# Patient Record
Sex: MALE | Race: WHITE | Employment: OTHER | URBAN - METROPOLITAN AREA
[De-identification: names, ages, dates, MRNs, and addresses within clinical notes are randomized per-mention and may not be internally consistent; named-entity substitution may affect disease eponyms.]

---

## 2017-03-10 ENCOUNTER — HOSPITAL ENCOUNTER (OUTPATIENT)
Dept: GENERAL RADIOLOGY | Facility: HOSPITAL | Age: 72
Discharge: HOME | End: 2017-03-10
Attending: INTERNAL MEDICINE
Payer: MEDICARE

## 2017-03-10 ENCOUNTER — EKG ENCOUNTER (OUTPATIENT)
Dept: LAB | Facility: HOSPITAL | Age: 72
End: 2017-03-10
Attending: INTERNAL MEDICINE
Payer: MEDICARE

## 2017-03-10 ENCOUNTER — HOSPITAL ENCOUNTER (OUTPATIENT)
Dept: CV DIAGNOSTICS | Facility: HOSPITAL | Age: 72
Discharge: HOME | End: 2017-03-10
Attending: INTERNAL MEDICINE
Payer: MEDICARE

## 2017-03-10 DIAGNOSIS — R07.9 CHEST PAIN: ICD-10-CM

## 2017-03-10 DIAGNOSIS — I25.10 CAD (CORONARY ARTERY DISEASE): ICD-10-CM

## 2017-03-10 LAB
ATRIAL RATE: 91 BPM
P AXIS: 79 DEGREES
P-R INTERVAL: 150 MS
Q-T INTERVAL: 364 MS
QRS DURATION: 64 MS
QTC CALCULATION (BEZET): 447 MS
R AXIS: 75 DEGREES
T AXIS: 68 DEGREES
VENTRICULAR RATE: 91 BPM

## 2017-03-10 PROCEDURE — 93017 CV STRESS TEST TRACING ONLY: CPT

## 2017-03-10 PROCEDURE — 93018 CV STRESS TEST I&R ONLY: CPT | Performed by: INTERNAL MEDICINE

## 2017-03-10 PROCEDURE — 93010 ELECTROCARDIOGRAM REPORT: CPT | Performed by: INTERNAL MEDICINE

## 2017-03-10 PROCEDURE — 93005 ELECTROCARDIOGRAM TRACING: CPT

## 2017-03-10 PROCEDURE — 71020 XR CHEST PA + LAT CHEST (CPT=71020): CPT

## 2017-03-30 ENCOUNTER — HOSPITAL ENCOUNTER (OUTPATIENT)
Dept: CV DIAGNOSTICS | Facility: HOSPITAL | Age: 72
Discharge: HOME | End: 2017-03-30
Attending: INTERNAL MEDICINE
Payer: MEDICARE

## 2017-03-30 DIAGNOSIS — R53.83 FATIGUE: ICD-10-CM

## 2017-03-30 DIAGNOSIS — R06.00 DOE (DYSPNEA ON EXERTION): ICD-10-CM

## 2017-03-30 DIAGNOSIS — I25.10 CAD (CORONARY ARTERY DISEASE): ICD-10-CM

## 2017-03-30 PROCEDURE — 93017 CV STRESS TEST TRACING ONLY: CPT

## 2017-03-30 PROCEDURE — 93350 STRESS TTE ONLY: CPT

## 2017-03-30 PROCEDURE — 93018 CV STRESS TEST I&R ONLY: CPT | Performed by: INTERNAL MEDICINE

## 2017-03-30 PROCEDURE — 93350 STRESS TTE ONLY: CPT | Performed by: INTERNAL MEDICINE

## 2017-03-31 NOTE — PROGRESS NOTES
Quick Note:    Please contact the patient and tell him that the echo portion of the stress test was normal, the EKG portion continues to be abnormal and I do not feel that he has any major coronary artery problems.        Yaniv Quiros M.D.

## 2017-04-06 PROBLEM — I51.5 CARDIAC CALCIFICATION (HCC): Status: ACTIVE | Noted: 2017-04-06

## 2017-08-08 ENCOUNTER — LAB ENCOUNTER (OUTPATIENT)
Dept: LAB | Facility: HOSPITAL | Age: 72
End: 2017-08-08
Attending: INTERNAL MEDICINE
Payer: MEDICARE

## 2017-08-08 DIAGNOSIS — K52.9 CHRONIC DIARRHEA: Primary | ICD-10-CM

## 2017-08-08 LAB
CRYPTOSP AG STL QL IA: NEGATIVE
G LAMBLIA AG STL QL IA: NEGATIVE

## 2017-08-08 PROCEDURE — 87046 STOOL CULTR AEROBIC BACT EA: CPT

## 2017-08-08 PROCEDURE — 87272 CRYPTOSPORIDIUM AG IF: CPT

## 2017-08-08 PROCEDURE — 87427 SHIGA-LIKE TOXIN AG IA: CPT

## 2017-08-08 PROCEDURE — 87329 GIARDIA AG IA: CPT

## 2017-08-08 PROCEDURE — 82272 OCCULT BLD FECES 1-3 TESTS: CPT

## 2017-08-08 PROCEDURE — 87209 SMEAR COMPLEX STAIN: CPT

## 2017-08-08 PROCEDURE — 87045 FECES CULTURE AEROBIC BACT: CPT

## 2017-08-08 PROCEDURE — 87177 OVA AND PARASITES SMEARS: CPT

## 2017-08-10 LAB
OVA AND PARASITE, TRICHROME STAIN: NEGATIVE
OVA AND PARASITE, WET MOUNT: NEGATIVE

## 2017-08-24 ENCOUNTER — LAB ENCOUNTER (OUTPATIENT)
Dept: LAB | Facility: HOSPITAL | Age: 72
End: 2017-08-24
Attending: PHYSICIAN ASSISTANT
Payer: MEDICARE

## 2017-08-24 DIAGNOSIS — K52.9 CHRONIC DIARRHEA: ICD-10-CM

## 2017-08-24 DIAGNOSIS — R19.7 DIARRHEA, UNSPECIFIED TYPE: ICD-10-CM

## 2017-08-24 LAB
ALBUMIN SERPL-MCNC: 3.7 G/DL (ref 3.5–4.8)
ALP LIVER SERPL-CCNC: 92 U/L (ref 45–117)
ALT SERPL-CCNC: 38 U/L (ref 17–63)
AST SERPL-CCNC: 39 U/L (ref 15–41)
BASOPHILS # BLD AUTO: 0.06 X10(3) UL (ref 0–0.1)
BASOPHILS NFR BLD AUTO: 0.8 %
BILIRUB SERPL-MCNC: 0.8 MG/DL (ref 0.1–2)
BUN BLD-MCNC: 13 MG/DL (ref 8–20)
C-REACTIVE PROTEIN: <0.29 MG/DL (ref ?–1)
CALCIUM BLD-MCNC: 9.4 MG/DL (ref 8.3–10.3)
CHLORIDE: 107 MMOL/L (ref 101–111)
CO2: 29 MMOL/L (ref 22–32)
CREAT BLD-MCNC: 1.08 MG/DL (ref 0.7–1.3)
EOSINOPHIL # BLD AUTO: 0.32 X10(3) UL (ref 0–0.3)
EOSINOPHIL NFR BLD AUTO: 4.4 %
ERYTHROCYTE [DISTWIDTH] IN BLOOD BY AUTOMATED COUNT: 12.5 % (ref 11.5–16)
FREE T4: 1 NG/DL (ref 0.9–1.8)
GLUCOSE BLD-MCNC: 106 MG/DL (ref 70–99)
HCT VFR BLD AUTO: 41.8 % (ref 37–53)
HGB BLD-MCNC: 14 G/DL (ref 13–17)
IMMATURE GRANULOCYTE COUNT: 0.02 X10(3) UL (ref 0–1)
IMMATURE GRANULOCYTE RATIO %: 0.3 %
IMMUNOGLOBULIN A: 269 MG/DL (ref 70–312)
LYMPHOCYTES # BLD AUTO: 0.91 X10(3) UL (ref 0.9–4)
LYMPHOCYTES NFR BLD AUTO: 12.5 %
M PROTEIN MFR SERPL ELPH: 7.2 G/DL (ref 6.1–8.3)
MCH RBC QN AUTO: 30.2 PG (ref 27–33.2)
MCHC RBC AUTO-ENTMCNC: 33.5 G/DL (ref 31–37)
MCV RBC AUTO: 90.1 FL (ref 80–99)
MONOCYTES # BLD AUTO: 0.64 X10(3) UL (ref 0.1–0.6)
MONOCYTES NFR BLD AUTO: 8.8 %
NEUTROPHIL ABS PRELIM: 5.31 X10 (3) UL (ref 1.3–6.7)
NEUTROPHILS # BLD AUTO: 5.31 X10(3) UL (ref 1.3–6.7)
NEUTROPHILS NFR BLD AUTO: 73.2 %
PLATELET # BLD AUTO: 228 10(3)UL (ref 150–450)
POTASSIUM SERPL-SCNC: 4.9 MMOL/L (ref 3.6–5.1)
RBC # BLD AUTO: 4.64 X10(6)UL (ref 3.8–5.8)
RED CELL DISTRIBUTION WIDTH-SD: 41.1 FL (ref 35.1–46.3)
SED RATE-ML: 8 MM/HR (ref 0–12)
SODIUM SERPL-SCNC: 143 MMOL/L (ref 136–144)
TSI SER-ACNC: 2.64 MIU/ML (ref 0.35–5.5)
WBC # BLD AUTO: 7.3 X10(3) UL (ref 4–13)

## 2017-08-24 PROCEDURE — 80053 COMPREHEN METABOLIC PANEL: CPT

## 2017-08-24 PROCEDURE — 86140 C-REACTIVE PROTEIN: CPT

## 2017-08-24 PROCEDURE — 87493 C DIFF AMPLIFIED PROBE: CPT

## 2017-08-24 PROCEDURE — 85652 RBC SED RATE AUTOMATED: CPT

## 2017-08-24 PROCEDURE — 84443 ASSAY THYROID STIM HORMONE: CPT

## 2017-08-24 PROCEDURE — 36415 COLL VENOUS BLD VENIPUNCTURE: CPT

## 2017-08-24 PROCEDURE — 84439 ASSAY OF FREE THYROXINE: CPT

## 2017-08-24 PROCEDURE — 83516 IMMUNOASSAY NONANTIBODY: CPT

## 2017-08-24 PROCEDURE — 82784 ASSAY IGA/IGD/IGG/IGM EACH: CPT

## 2017-08-24 PROCEDURE — 85025 COMPLETE CBC W/AUTO DIFF WBC: CPT

## 2017-08-28 LAB
TISSUE TRANSGLUTAMINASE AB,IGA: <1.2 U/ML (ref ?–15)
TISSUE TRANSGLUTAMINASE IGA QUALITATIVE: NEGATIVE

## 2017-09-12 ENCOUNTER — HOSPITAL ENCOUNTER (OUTPATIENT)
Dept: CT IMAGING | Facility: HOSPITAL | Age: 72
Discharge: HOME | End: 2017-09-12
Attending: PHYSICIAN ASSISTANT
Payer: MEDICARE

## 2017-09-12 DIAGNOSIS — R19.7 DIARRHEA, UNSPECIFIED TYPE: ICD-10-CM

## 2017-09-12 PROCEDURE — 74177 CT ABD & PELVIS W/CONTRAST: CPT | Performed by: PHYSICIAN ASSISTANT

## 2018-01-08 ENCOUNTER — HOSPITAL ENCOUNTER (OUTPATIENT)
Dept: GENERAL RADIOLOGY | Facility: HOSPITAL | Age: 73
Discharge: HOME | End: 2018-01-08
Attending: INTERNAL MEDICINE
Payer: MEDICARE

## 2018-01-08 DIAGNOSIS — M25.552 LEFT HIP PAIN: ICD-10-CM

## 2018-01-08 PROCEDURE — 72110 X-RAY EXAM L-2 SPINE 4/>VWS: CPT | Performed by: INTERNAL MEDICINE

## 2018-01-08 PROCEDURE — 73502 X-RAY EXAM HIP UNI 2-3 VIEWS: CPT | Performed by: INTERNAL MEDICINE

## 2018-06-06 ENCOUNTER — APPOINTMENT (OUTPATIENT)
Dept: CT IMAGING | Facility: HOSPITAL | Age: 73
End: 2018-06-06
Attending: EMERGENCY MEDICINE
Payer: MEDICARE

## 2018-06-06 ENCOUNTER — HOSPITAL ENCOUNTER (EMERGENCY)
Facility: HOSPITAL | Age: 73
Discharge: HOME | End: 2018-06-06
Attending: EMERGENCY MEDICINE
Payer: MEDICARE

## 2018-06-06 VITALS
RESPIRATION RATE: 16 BRPM | DIASTOLIC BLOOD PRESSURE: 64 MMHG | HEART RATE: 64 BPM | OXYGEN SATURATION: 99 % | HEIGHT: 68 IN | BODY MASS INDEX: 24.25 KG/M2 | WEIGHT: 160 LBS | TEMPERATURE: 97 F | SYSTOLIC BLOOD PRESSURE: 116 MMHG

## 2018-06-06 DIAGNOSIS — S09.8XXA BLUNT HEAD TRAUMA, INITIAL ENCOUNTER: Primary | ICD-10-CM

## 2018-06-06 PROCEDURE — 99284 EMERGENCY DEPT VISIT MOD MDM: CPT

## 2018-06-06 PROCEDURE — 70450 CT HEAD/BRAIN W/O DYE: CPT | Performed by: EMERGENCY MEDICINE

## 2018-06-06 NOTE — ED NOTES
Patient waiting for CT at this time. Wife at bedside. Observed ambulatory to bathroom with steady gait. No distress observed. Declines offer of ice pack for site of laceration repair.

## 2018-06-06 NOTE — ED NOTES
Spoke with Dr. Klaus Gayle at this time regarding patient.  Provided with callback number once she consults with MD.

## 2018-06-06 NOTE — ED PROVIDER NOTES
Patient Seen in: [facility] Emergency Department    History   Patient presents with:  Head Neck Injury (neurologic, musculoskeletal)    Stated Complaint: head injury from clinic, needs CT.  on plavix    HPI    75-year-old male sent for evaluation of Pt is oriented to person, place, and time. Appears well-developed and well-nourished. Head: Normocephalic, there is a  Nose: Nose normal.   Eyes: EOM are normal. Pupils are equal, round, and reactive to light. Neck: Normal range of motion. Neck supple. encounter  (primary encounter diagnosis)    Disposition:  There is no disposition on file for this visit. There is no disposition time on file for this visit.     Follow-up:  Britany Thomson MD  [address] [address]

## 2018-06-06 NOTE — ED NOTES
Patient observed ambulatory to room at this time. Care of patient assumed. Patient has already received staples for scalp lac and tetanus shot at immediate care. Sent for imaging d/t being on plavix and aspirin.  Patient alert and oriented, denies any dizzi

## 2018-06-06 NOTE — ED NOTES
Spoke with Dr. Zeyad White additional time, informed that Dr. Josue Angel reviewed CT at this time and patient can be discharged home with outpatient followup. Patient to be educated to return to ED for any development of symptoms.  Natalya Sanchez at bedside

## 2018-06-06 NOTE — ED NOTES
Patient back from CT and resting comfortably on cart. No distress observed. No complaints at this time. Wife at bedside.

## 2018-06-06 NOTE — ED INITIAL ASSESSMENT (HPI)
Patient presents from immediate care. He was outside today and lost his balance, fell backward, and injured the top of his head on a metal bench. Immediate care stapled laceration and provided tetanus immunization.  He is on plavix and was sent for further

## 2019-06-14 ENCOUNTER — APPOINTMENT (OUTPATIENT)
Dept: GENERAL RADIOLOGY | Facility: HOSPITAL | Age: 74
End: 2019-06-14
Attending: EMERGENCY MEDICINE
Payer: MEDICARE

## 2019-06-14 ENCOUNTER — APPOINTMENT (OUTPATIENT)
Dept: CT IMAGING | Facility: HOSPITAL | Age: 74
End: 2019-06-14
Attending: EMERGENCY MEDICINE
Payer: MEDICARE

## 2019-06-14 ENCOUNTER — HOSPITAL ENCOUNTER (EMERGENCY)
Facility: HOSPITAL | Age: 74
Discharge: HOME | End: 2019-06-14
Attending: EMERGENCY MEDICINE
Payer: MEDICARE

## 2019-06-14 VITALS
RESPIRATION RATE: 13 BRPM | OXYGEN SATURATION: 99 % | DIASTOLIC BLOOD PRESSURE: 72 MMHG | BODY MASS INDEX: 23.62 KG/M2 | HEIGHT: 70 IN | WEIGHT: 165 LBS | SYSTOLIC BLOOD PRESSURE: 135 MMHG | TEMPERATURE: 99 F | HEART RATE: 72 BPM

## 2019-06-14 DIAGNOSIS — R91.8 PULMONARY NODULES: ICD-10-CM

## 2019-06-14 DIAGNOSIS — J40 BRONCHITIS: Primary | ICD-10-CM

## 2019-06-14 PROCEDURE — 93005 ELECTROCARDIOGRAM TRACING: CPT

## 2019-06-14 PROCEDURE — 85378 FIBRIN DEGRADE SEMIQUANT: CPT | Performed by: EMERGENCY MEDICINE

## 2019-06-14 PROCEDURE — 71275 CT ANGIOGRAPHY CHEST: CPT | Performed by: EMERGENCY MEDICINE

## 2019-06-14 PROCEDURE — 80053 COMPREHEN METABOLIC PANEL: CPT | Performed by: EMERGENCY MEDICINE

## 2019-06-14 PROCEDURE — 85730 THROMBOPLASTIN TIME PARTIAL: CPT | Performed by: EMERGENCY MEDICINE

## 2019-06-14 PROCEDURE — 99285 EMERGENCY DEPT VISIT HI MDM: CPT

## 2019-06-14 PROCEDURE — 93010 ELECTROCARDIOGRAM REPORT: CPT

## 2019-06-14 PROCEDURE — 36415 COLL VENOUS BLD VENIPUNCTURE: CPT

## 2019-06-14 PROCEDURE — 85610 PROTHROMBIN TIME: CPT | Performed by: EMERGENCY MEDICINE

## 2019-06-14 PROCEDURE — 71046 X-RAY EXAM CHEST 2 VIEWS: CPT | Performed by: EMERGENCY MEDICINE

## 2019-06-14 PROCEDURE — 85025 COMPLETE CBC W/AUTO DIFF WBC: CPT | Performed by: EMERGENCY MEDICINE

## 2019-06-14 PROCEDURE — 94640 AIRWAY INHALATION TREATMENT: CPT

## 2019-06-14 PROCEDURE — 83880 ASSAY OF NATRIURETIC PEPTIDE: CPT | Performed by: EMERGENCY MEDICINE

## 2019-06-14 PROCEDURE — 84484 ASSAY OF TROPONIN QUANT: CPT | Performed by: EMERGENCY MEDICINE

## 2019-06-14 RX ORDER — IPRATROPIUM BROMIDE AND ALBUTEROL SULFATE 2.5; .5 MG/3ML; MG/3ML
3 SOLUTION RESPIRATORY (INHALATION) ONCE
Status: COMPLETED | OUTPATIENT
Start: 2019-06-14 | End: 2019-06-14

## 2019-06-14 RX ORDER — PREDNISONE 20 MG/1
40 TABLET ORAL DAILY
Qty: 10 TABLET | Refills: 0 | Status: SHIPPED | OUTPATIENT
Start: 2019-06-14 | End: 2019-06-19

## 2019-06-14 RX ORDER — ALBUTEROL SULFATE 90 UG/1
2 AEROSOL, METERED RESPIRATORY (INHALATION) EVERY 4 HOURS PRN
Qty: 1 INHALER | Refills: 0 | Status: SHIPPED | OUTPATIENT
Start: 2019-06-14 | End: 2019-07-14

## 2019-06-14 RX ORDER — PREDNISONE 20 MG/1
60 TABLET ORAL ONCE
Status: COMPLETED | OUTPATIENT
Start: 2019-06-14 | End: 2019-06-14

## 2019-06-14 NOTE — ED INITIAL ASSESSMENT (HPI)
Patient presents with cold symptoms that had began 2 weeks PTA that has progressively worsened. Patient denies fever, though reports consistent congested cough and increased fatigue. Seen by PCP and sent to ER for breathing treatment.

## 2019-06-14 NOTE — ED PROVIDER NOTES
Patient Seen in: [facility] Emergency Department    History   Patient presents with:  Dyspnea RADHA SOB (respiratory)    Stated Complaint: radha    HPI    This is a 12-year-old male who presents with shortness of breath and cough with past medical histo 1706 129/84   Pulse 06/14/19 1706 80   Resp 06/14/19 1706 14   Temp 06/14/19 1706 98.9 °F (37.2 °C)   Temp src 06/14/19 1706 Temporal   SpO2 06/14/19 1706 93 %   O2 Device 06/14/19 1703 None (Room air)       Current:/72   Pulse 72   Temp 98.9 °F (37. Abnormality         Status                     ---------                               -----------         ------                     CBC W/ DIFFERENTIAL[[id number]]          Abnormal            Final result                 Please view results transcribed by Technologist)  Patient has had difficulty breathing with persistent congested cough abd increased fatigue. CONTRAST USED:  70cc of Omnipaque 350  FINDINGS:  VASCULATURE:  Normal 3 vessel arch. Atheromatous calcifications of the aorta.   No chest was obtained and demonstrated no acute PE. Multiple pulmonary nodules were noted. Bronchial wall thickening consistent with bronchitis was noted. These findings were communicated to patient. Will treat for bronchitis. Prednisone given in ER.

## 2019-07-19 ENCOUNTER — RT VISIT (OUTPATIENT)
Dept: RESPIRATORY THERAPY | Facility: HOSPITAL | Age: 74
End: 2019-07-19
Attending: INTERNAL MEDICINE
Payer: MEDICARE

## 2019-07-19 ENCOUNTER — HOSPITAL ENCOUNTER (OUTPATIENT)
Dept: GENERAL RADIOLOGY | Facility: HOSPITAL | Age: 74
Discharge: HOME | End: 2019-07-19
Attending: INTERNAL MEDICINE
Payer: MEDICARE

## 2019-07-19 DIAGNOSIS — R05.9 COUGH: ICD-10-CM

## 2019-07-19 DIAGNOSIS — R06.02 SOB (SHORTNESS OF BREATH): ICD-10-CM

## 2019-07-19 PROCEDURE — 71046 X-RAY EXAM CHEST 2 VIEWS: CPT | Performed by: INTERNAL MEDICINE

## 2019-07-23 NOTE — PROCEDURES
Spirometry and  flow volume loop show evidence of mild obstruction. .    There was no change in spirometry after bronchodilator challenge. Normal TLC, no evidence of restriction    There is mild toward moderate decrease in the diffusing capacity. Pauline Alexis

## 2020-07-07 ENCOUNTER — HOSPITAL ENCOUNTER (OUTPATIENT)
Dept: GENERAL RADIOLOGY | Age: 75
Discharge: HOME | End: 2020-07-07
Attending: INTERNAL MEDICINE
Payer: MEDICARE

## 2020-07-07 DIAGNOSIS — R07.81 RIB PAIN ON RIGHT SIDE: ICD-10-CM
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
## 2020-07-07 DIAGNOSIS — M54.9 MID BACK PAIN ON RIGHT SIDE: ICD-10-CM

## 2020-07-07 PROCEDURE — 72072 X-RAY EXAM THORAC SPINE 3VWS: CPT | Performed by: INTERNAL MEDICINE

## 2020-07-07 PROCEDURE — 71101 X-RAY EXAM UNILAT RIBS/CHEST: CPT | Performed by: INTERNAL MEDICINE

## 2020-10-19 ENCOUNTER — OFFICE VISIT (OUTPATIENT)
Dept: PODIATRY CLINIC | Facility: CLINIC | Age: 75
End: 2020-10-19
Payer: COMMERCIAL

## 2020-10-19 DIAGNOSIS — M20.42 HAMMER TOE OF LEFT FOOT: ICD-10-CM

## 2020-10-19 DIAGNOSIS — M79.672 LEFT FOOT PAIN: Primary | ICD-10-CM

## 2020-10-19 PROCEDURE — 99203 OFFICE O/P NEW LOW 30 MIN: CPT | Performed by: PODIATRIST

## 2020-10-19 NOTE — PROGRESS NOTES
Jaclyn Acuna is a 66year old male. Patient presents with:  New Patient: 2nd toe left foot hurts - has been bent for years - pain scale 6/10.         HPI:     Resents today he has had some pain and discomfort second toe the toe has been tender for years i Years of education: Not on file      Highest education level: Not on file    Occupational History      Occupation: Retired     Tobacco Use      Smoking status: Never Smoker      Smokeless tobacco: Never Used    Substance and Sexual Activ of these issues and agrees to the plan. No follow-ups on file.     Jaelyn Medeiros DPM  10/19/2020

## 2021-02-02 ENCOUNTER — IMMUNIZATION (OUTPATIENT)
Dept: LAB | Facility: HOSPITAL | Age: 76
End: 2021-02-02
Attending: EMERGENCY MEDICINE
Payer: MEDICARE

## 2021-02-02 DIAGNOSIS — Z23 NEED FOR VACCINATION: Primary | ICD-10-CM

## 2021-02-02 PROCEDURE — 0011A SARSCOV2 VAC 100MCG/0.5ML IM: CPT

## 2021-03-02 ENCOUNTER — IMMUNIZATION (OUTPATIENT)
Dept: LAB | Facility: HOSPITAL | Age: 76
End: 2021-03-02
Attending: EMERGENCY MEDICINE
Payer: MEDICARE

## 2021-03-02 DIAGNOSIS — Z23 NEED FOR VACCINATION: Primary | ICD-10-CM

## 2021-03-02 PROCEDURE — 0012A SARSCOV2 VAC 100MCG/0.5ML IM: CPT

## 2021-08-11 ENCOUNTER — LABORATORY ENCOUNTER (OUTPATIENT)
Dept: LAB | Age: 76
End: 2021-08-11
Attending: INTERNAL MEDICINE
Payer: MEDICARE

## 2021-08-11 DIAGNOSIS — D64.9 ANEMIA: Primary | ICD-10-CM

## 2021-08-11 DIAGNOSIS — M62.838 MUSCLE SPASM: ICD-10-CM

## 2021-08-11 LAB
HAV IGM SER QL: 1.9 MG/DL (ref 1.6–2.6)
IRON SATURATION: 16 %
IRON SERPL-MCNC: 45 UG/DL
POTASSIUM SERPL-SCNC: 3.9 MMOL/L (ref 3.5–5.1)
TOTAL IRON BINDING CAPACITY: 280 UG/DL (ref 240–450)
TRANSFERRIN SERPL-MCNC: 188 MG/DL (ref 200–360)

## 2021-08-11 PROCEDURE — 83735 ASSAY OF MAGNESIUM: CPT

## 2021-08-11 PROCEDURE — 84132 ASSAY OF SERUM POTASSIUM: CPT

## 2021-08-11 PROCEDURE — 83540 ASSAY OF IRON: CPT

## 2021-08-11 PROCEDURE — 83550 IRON BINDING TEST: CPT

## 2021-08-11 PROCEDURE — 36415 COLL VENOUS BLD VENIPUNCTURE: CPT

## 2021-08-12 ENCOUNTER — LAB ENCOUNTER (OUTPATIENT)
Dept: LAB | Age: 76
End: 2021-08-12
Attending: INTERNAL MEDICINE
Payer: MEDICARE

## 2021-08-12 DIAGNOSIS — D64.9 ANEMIA: Primary | ICD-10-CM

## 2021-08-12 LAB
BASOPHILS # BLD AUTO: 0.07 X10(3) UL (ref 0–0.2)
BASOPHILS NFR BLD AUTO: 0.7 %
EOSINOPHIL # BLD AUTO: 0.33 X10(3) UL (ref 0–0.7)
EOSINOPHIL NFR BLD AUTO: 3.1 %
ERYTHROCYTE [DISTWIDTH] IN BLOOD BY AUTOMATED COUNT: 13.9 %
HCT VFR BLD AUTO: 27.1 %
HGB BLD-MCNC: 9 G/DL
IMM GRANULOCYTES # BLD AUTO: 0.23 X10(3) UL (ref 0–1)
IMM GRANULOCYTES NFR BLD: 2.1 %
LYMPHOCYTES # BLD AUTO: 1.91 X10(3) UL (ref 1–4)
LYMPHOCYTES NFR BLD AUTO: 17.8 %
MCH RBC QN AUTO: 30.8 PG (ref 26–34)
MCHC RBC AUTO-ENTMCNC: 33.2 G/DL (ref 31–37)
MCV RBC AUTO: 92.8 FL
MONOCYTES # BLD AUTO: 0.79 X10(3) UL (ref 0.1–1)
MONOCYTES NFR BLD AUTO: 7.4 %
NEUTROPHILS # BLD AUTO: 7.39 X10 (3) UL (ref 1.5–7.7)
NEUTROPHILS # BLD AUTO: 7.39 X10(3) UL (ref 1.5–7.7)
NEUTROPHILS NFR BLD AUTO: 68.9 %
PLATELET # BLD AUTO: 489 10(3)UL (ref 150–450)
RBC # BLD AUTO: 2.92 X10(6)UL
WBC # BLD AUTO: 10.7 X10(3) UL (ref 4–11)

## 2021-08-12 PROCEDURE — 85025 COMPLETE CBC W/AUTO DIFF WBC: CPT

## 2021-08-12 PROCEDURE — 36415 COLL VENOUS BLD VENIPUNCTURE: CPT

## 2021-08-17 ENCOUNTER — TELEPHONE (OUTPATIENT)
Dept: PHYSICAL THERAPY | Facility: HOSPITAL | Age: 76
End: 2021-08-17

## 2021-08-17 ENCOUNTER — ORDER TRANSCRIPTION (OUTPATIENT)
Dept: PHYSICAL THERAPY | Facility: HOSPITAL | Age: 76
End: 2021-08-17

## 2021-08-17 DIAGNOSIS — Z96.641 STATUS POST RIGHT HIP REPLACEMENT: ICD-10-CM

## 2021-08-17 DIAGNOSIS — Z87.81 S/P RIGHT HIP FRACTURE: Primary | ICD-10-CM

## 2021-08-18 ENCOUNTER — OFFICE VISIT (OUTPATIENT)
Dept: PHYSICAL THERAPY | Facility: HOSPITAL | Age: 76
End: 2021-08-18
Attending: ORTHOPAEDIC SURGERY
Payer: MEDICARE

## 2021-08-18 DIAGNOSIS — Z96.641 STATUS POST RIGHT HIP REPLACEMENT: ICD-10-CM

## 2021-08-18 DIAGNOSIS — Z87.81 S/P RIGHT HIP FRACTURE: ICD-10-CM

## 2021-08-18 PROCEDURE — 97161 PT EVAL LOW COMPLEX 20 MIN: CPT

## 2021-08-18 PROCEDURE — 97110 THERAPEUTIC EXERCISES: CPT

## 2021-08-18 NOTE — PROGRESS NOTES
POST-OP HIP EVALUATION:   Referring Physician: Dr. 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 Pt voiced understanding and performs HEP correctly without reported pain. Skilled Physical Therapy is medically necessary to address the above impairments and reach functional goals.      Precautions:  no crossing leg, deep squattin strength    Charges: PT Eval Low Complexity, therex x 1      Total Timed Treatment: 15 min     Total Treatment Time: 45 min     PLAN OF CARE:    Goals: (To be met in 10 visits)   · Pt will have improved hip AROM Flex to 120 deg and ABD to 35 deg to be able

## 2021-08-24 ENCOUNTER — OFFICE VISIT (OUTPATIENT)
Dept: PHYSICAL THERAPY | Facility: HOSPITAL | Age: 76
End: 2021-08-24
Attending: ORTHOPAEDIC SURGERY
Payer: MEDICARE

## 2021-08-24 PROCEDURE — 97110 THERAPEUTIC EXERCISES: CPT

## 2021-08-24 PROCEDURE — 97140 MANUAL THERAPY 1/> REGIONS: CPT

## 2021-08-24 NOTE — PROGRESS NOTES
Dx: S/P right hip fracture (Z87.81)  Status post right hip replacement (Z96.641)             Authorized # of Visits:  10 (Medicare)         Next MD visit: none scheduled  Fall Risk: standard         Precautions: n/a             Subjective: Patient reports Single leg balance, 3 min         Lateral walk, yellow TB, 2 laps         Bridge, 2x15         S/l clam, red TB, 2x10         Sit<>stand to table slow, 8         Shuttle SLP, 37#, 10         Passive hip ROM, 10 min             Charges: Reny 2( 35 min) man

## 2021-08-26 ENCOUNTER — LAB ENCOUNTER (OUTPATIENT)
Dept: LAB | Age: 76
End: 2021-08-26
Attending: INTERNAL MEDICINE
Payer: MEDICARE

## 2021-08-26 ENCOUNTER — OFFICE VISIT (OUTPATIENT)
Dept: PHYSICAL THERAPY | Facility: HOSPITAL | Age: 76
End: 2021-08-26
Attending: ORTHOPAEDIC SURGERY
Payer: MEDICARE

## 2021-08-26 DIAGNOSIS — D64.9 ANEMIA: Primary | ICD-10-CM

## 2021-08-26 LAB
BASOPHILS # BLD AUTO: 0.06 X10(3) UL (ref 0–0.2)
BASOPHILS NFR BLD AUTO: 0.9 %
EOSINOPHIL # BLD AUTO: 0.29 X10(3) UL (ref 0–0.7)
EOSINOPHIL NFR BLD AUTO: 4.3 %
ERYTHROCYTE [DISTWIDTH] IN BLOOD BY AUTOMATED COUNT: 13.8 %
HCT VFR BLD AUTO: 31.2 %
HGB BLD-MCNC: 10 G/DL
IMM GRANULOCYTES # BLD AUTO: 0.02 X10(3) UL (ref 0–1)
IMM GRANULOCYTES NFR BLD: 0.3 %
LYMPHOCYTES # BLD AUTO: 1.17 X10(3) UL (ref 1–4)
LYMPHOCYTES NFR BLD AUTO: 17.5 %
MCH RBC QN AUTO: 30 PG (ref 26–34)
MCHC RBC AUTO-ENTMCNC: 32.1 G/DL (ref 31–37)
MCV RBC AUTO: 93.7 FL
MONOCYTES # BLD AUTO: 0.65 X10(3) UL (ref 0.1–1)
MONOCYTES NFR BLD AUTO: 9.7 %
NEUTROPHILS # BLD AUTO: 4.48 X10 (3) UL (ref 1.5–7.7)
NEUTROPHILS # BLD AUTO: 4.48 X10(3) UL (ref 1.5–7.7)
NEUTROPHILS NFR BLD AUTO: 67.3 %
PLATELET # BLD AUTO: 376 10(3)UL (ref 150–450)
RBC # BLD AUTO: 3.33 X10(6)UL
WBC # BLD AUTO: 6.7 X10(3) UL (ref 4–11)

## 2021-08-26 PROCEDURE — 97140 MANUAL THERAPY 1/> REGIONS: CPT

## 2021-08-26 PROCEDURE — 36415 COLL VENOUS BLD VENIPUNCTURE: CPT

## 2021-08-26 PROCEDURE — 97110 THERAPEUTIC EXERCISES: CPT

## 2021-08-26 PROCEDURE — 85025 COMPLETE CBC W/AUTO DIFF WBC: CPT

## 2021-08-26 NOTE — PROGRESS NOTES
Dx: S/P right hip fracture (Z87.81)  Status post right hip replacement (Z96.641)             Authorized # of Visits:  10 (Medicare)         Next MD visit: none scheduled  Fall Risk: standard         Precautions: n/a             Subjective: Patient reports walking, 3 laps        Single leg balance, 3 min Troy step over and lateral 3 laps total        Lateral walk, yellow TB, 2 laps Lateral walk, yellow TB, 3 laps        Bridge, 2x15         S/l clam, red TB, 2x10         Sit<>stand to table slow, 8 Sit<>st

## 2021-08-31 ENCOUNTER — TELEPHONE (OUTPATIENT)
Dept: PHYSICAL THERAPY | Facility: HOSPITAL | Age: 76
End: 2021-08-31

## 2021-09-01 ENCOUNTER — APPOINTMENT (OUTPATIENT)
Dept: PHYSICAL THERAPY | Facility: HOSPITAL | Age: 76
End: 2021-09-01
Attending: ORTHOPAEDIC SURGERY
Payer: MEDICARE

## 2021-09-08 ENCOUNTER — APPOINTMENT (OUTPATIENT)
Dept: PHYSICAL THERAPY | Facility: HOSPITAL | Age: 76
End: 2021-09-08
Attending: ORTHOPAEDIC SURGERY
Payer: MEDICARE

## 2021-09-13 ENCOUNTER — OFFICE VISIT (OUTPATIENT)
Dept: PHYSICAL THERAPY | Facility: HOSPITAL | Age: 76
End: 2021-09-13
Attending: ORTHOPAEDIC SURGERY
Payer: MEDICARE

## 2021-09-13 PROCEDURE — 97110 THERAPEUTIC EXERCISES: CPT

## 2021-09-13 PROCEDURE — 97140 MANUAL THERAPY 1/> REGIONS: CPT

## 2021-09-13 NOTE — PROGRESS NOTES
Dx: S/P right hip fracture (Z87.81)  Status post right hip replacement (Z96.641)             Authorized # of Visits:  10 (Medicare)         Next MD visit: none scheduled  Fall Risk: standard         Precautions: n/a             Subjective: Patient reports treadmill walking, 1.7 mph, 4 min stationary bike, lv 3, 3 min stationary bike, lv 3, 5 min       FSU, 8\", 10  10\", 10 FSU, 8\", 2x1 min FSU, 8\", 2x1 min       Walking march, 2 laps Tandem walking, 3 laps Marching toe tap on high box, 3 min       [address]

## 2021-09-15 ENCOUNTER — APPOINTMENT (OUTPATIENT)
Dept: PHYSICAL THERAPY | Facility: HOSPITAL | Age: 76
End: 2021-09-15
Attending: ORTHOPAEDIC SURGERY
Payer: MEDICARE

## 2021-09-20 ENCOUNTER — OFFICE VISIT (OUTPATIENT)
Dept: PHYSICAL THERAPY | Facility: HOSPITAL | Age: 76
End: 2021-09-20
Attending: ORTHOPAEDIC SURGERY
Payer: MEDICARE

## 2021-09-20 PROCEDURE — 97140 MANUAL THERAPY 1/> REGIONS: CPT

## 2021-09-20 PROCEDURE — 97110 THERAPEUTIC EXERCISES: CPT

## 2021-09-20 NOTE — PROGRESS NOTES
Dx: S/P right hip fracture (Z87.81)  Status post right hip replacement (Z96.641)             Authorized # of Visits:  10 (Medicare)         Next MD visit: none scheduled  Fall Risk: standard         Precautions: n/a             Subjective: Patient reports min stationary bike, lv 3, 3 min stationary bike, lv 3, 5 min stationary bike, lv 4, 5 min      FSU, 8\", 10  10\", 10 FSU, 8\", 2x1 min FSU, 8\", 2x1 min BOSU lunge, [id number]      Walking march, 2 laps Tandem walking, 3 laps Marching toe tap on high box,

## 2021-09-22 ENCOUNTER — OFFICE VISIT (OUTPATIENT)
Dept: PHYSICAL THERAPY | Facility: HOSPITAL | Age: 76
End: 2021-09-22
Attending: ORTHOPAEDIC SURGERY
Payer: MEDICARE

## 2021-09-22 PROCEDURE — 97140 MANUAL THERAPY 1/> REGIONS: CPT

## 2021-09-22 PROCEDURE — 97112 NEUROMUSCULAR REEDUCATION: CPT

## 2021-09-22 PROCEDURE — 97110 THERAPEUTIC EXERCISES: CPT

## 2021-09-22 NOTE — PROGRESS NOTES
Dx: S/P right hip fracture (Z87.81)  Status post right hip replacement (Z96.641)             Authorized # of Visits:  10 (Medicare)         Next MD visit: none scheduled  Fall Risk: standard         Precautions: n/a             Subjective: Patient reports 10  10\", 10 FSU, 8\", 2x1 min FSU, 8\", 2x1 min BOSU lunge, [id number] BOSU lunge, [id number]     Walking march, 2 laps Tandem walking, 3 laps Marching toe tap on high box, 3 min Open the gate stretch, 3 min Airex tandem stance, 3 min     Single leg balance,

## 2021-09-27 ENCOUNTER — OFFICE VISIT (OUTPATIENT)
Dept: PHYSICAL THERAPY | Facility: HOSPITAL | Age: 76
End: 2021-09-27
Attending: ORTHOPAEDIC SURGERY
Payer: MEDICARE

## 2021-09-27 PROCEDURE — 97112 NEUROMUSCULAR REEDUCATION: CPT

## 2021-09-27 PROCEDURE — 97110 THERAPEUTIC EXERCISES: CPT

## 2021-09-27 PROCEDURE — 97140 MANUAL THERAPY 1/> REGIONS: CPT

## 2021-09-27 NOTE — PROGRESS NOTES
Discharge Summary  Pt has attended 7 visits in Physical Therapy.    Dx: S/P right hip fracture (Z87.81)  Status post right hip replacement (Z96.641)             Authorized # of Visits:  10 (Medicare)         Next MD visit: none scheduled  Fall Risk: standa min stationary bike, lv 4, 5 min stationary bike, lv 4, 5 min    FSU, 8\", 10  10\", 10 FSU, 8\", 2x1 min FSU, 8\", 2x1 min BOSU lunge, [id number] BOSU lunge, [id number]     Walking march, 2 laps Tandem walking, 3 laps Marching toe tap on high box, 3 min Open

## 2021-09-29 ENCOUNTER — APPOINTMENT (OUTPATIENT)
Dept: PHYSICAL THERAPY | Facility: HOSPITAL | Age: 76
End: 2021-09-29
Attending: ORTHOPAEDIC SURGERY
Payer: MEDICARE

## 2021-10-28 ENCOUNTER — LABORATORY ENCOUNTER (OUTPATIENT)
Dept: LAB | Age: 76
End: 2021-10-28
Attending: INTERNAL MEDICINE
Payer: MEDICARE

## 2021-10-28 DIAGNOSIS — D64.9 ANEMIA: Primary | ICD-10-CM

## 2021-10-28 PROCEDURE — 85025 COMPLETE CBC W/AUTO DIFF WBC: CPT

## 2021-10-28 PROCEDURE — 36415 COLL VENOUS BLD VENIPUNCTURE: CPT

## 2022-01-19 ENCOUNTER — OFFICE VISIT (OUTPATIENT)
Dept: PODIATRY CLINIC | Facility: CLINIC | Age: 77
End: 2022-01-19
Payer: COMMERCIAL

## 2022-01-19 DIAGNOSIS — M79.672 LEFT FOOT PAIN: Primary | ICD-10-CM

## 2022-01-19 DIAGNOSIS — M20.42 HAMMER TOE OF LEFT FOOT: ICD-10-CM

## 2022-01-19 PROCEDURE — 99213 OFFICE O/P EST LOW 20 MIN: CPT | Performed by: PODIATRIST

## 2022-01-23 NOTE — PROGRESS NOTES
Santy Estrada is a 64year old male. Patient presents with:  Consult: Bilateral bunion and hammer toes, pain level 5/10 today. Former pt of Dr Yao Guzman.         HPI:   This pleasant patient presents to the clinic with a chief complaint of painful hammertoes • Heart Disorder Mother         \"Heart trouble\"   • Diabetes Brother         Younger brother   • Diabetes Son         Type II      Social History    Socioeconomic History      Marital status:       Number of children: 4    Occupational History PLAN:   Diagnoses and all orders for this visit:    Left foot pain    Hammer toe of left foot        Plan: At this point in time the patient did not want any type of surgery done and as result of that we did not do x-rays.   Made him a pad to keep pressure

## 2022-01-31 ENCOUNTER — LAB ENCOUNTER (OUTPATIENT)
Dept: LAB | Age: 77
End: 2022-01-31
Attending: INTERNAL MEDICINE
Payer: MEDICARE

## 2022-01-31 DIAGNOSIS — D64.9 ANEMIA: Primary | ICD-10-CM

## 2022-01-31 LAB
BASOPHILS # BLD AUTO: 0.05 X10(3) UL (ref 0–0.2)
BASOPHILS NFR BLD AUTO: 0.5 %
EOSINOPHIL # BLD AUTO: 0.06 X10(3) UL (ref 0–0.7)
EOSINOPHIL NFR BLD AUTO: 0.6 %
ERYTHROCYTE [DISTWIDTH] IN BLOOD BY AUTOMATED COUNT: 13 %
HCT VFR BLD AUTO: 35 %
HGB BLD-MCNC: 11.3 G/DL
IMM GRANULOCYTES # BLD AUTO: 0.04 X10(3) UL (ref 0–1)
IMM GRANULOCYTES NFR BLD: 0.4 %
LYMPHOCYTES # BLD AUTO: 1.54 X10(3) UL (ref 1–4)
LYMPHOCYTES NFR BLD AUTO: 14.8 %
MCH RBC QN AUTO: 30.2 PG (ref 26–34)
MCHC RBC AUTO-ENTMCNC: 32.3 G/DL (ref 31–37)
MCV RBC AUTO: 93.6 FL
MONOCYTES # BLD AUTO: 0.61 X10(3) UL (ref 0.1–1)
NEUTROPHILS # BLD AUTO: 8.1 X10 (3) UL (ref 1.5–7.7)
NEUTROPHILS # BLD AUTO: 8.1 X10(3) UL (ref 1.5–7.7)
NEUTROPHILS NFR BLD AUTO: 77.8 %
PLATELET # BLD AUTO: 285 10(3)UL (ref 150–450)
RBC # BLD AUTO: 3.74 X10(6)UL
WBC # BLD AUTO: 10.4 X10(3) UL (ref 4–11)

## 2022-01-31 PROCEDURE — 85025 COMPLETE CBC W/AUTO DIFF WBC: CPT

## 2022-01-31 PROCEDURE — 36415 COLL VENOUS BLD VENIPUNCTURE: CPT

## 2022-02-01 ENCOUNTER — APPOINTMENT (OUTPATIENT)
Dept: GENERAL RADIOLOGY | Facility: HOSPITAL | Age: 77
End: 2022-02-01
Attending: EMERGENCY MEDICINE
Payer: MEDICARE

## 2022-02-01 ENCOUNTER — HOSPITAL ENCOUNTER (EMERGENCY)
Facility: HOSPITAL | Age: 77
Discharge: HOME | End: 2022-02-01
Attending: EMERGENCY MEDICINE
Payer: MEDICARE

## 2022-02-01 VITALS
TEMPERATURE: 98 F | HEIGHT: 70 IN | DIASTOLIC BLOOD PRESSURE: 61 MMHG | HEART RATE: 100 BPM | SYSTOLIC BLOOD PRESSURE: 120 MMHG | BODY MASS INDEX: 23.62 KG/M2 | OXYGEN SATURATION: 100 % | WEIGHT: 165 LBS | RESPIRATION RATE: 17 BRPM

## 2022-02-01 DIAGNOSIS — R06.6 HICCUPS: Primary | ICD-10-CM

## 2022-02-01 LAB
ALBUMIN SERPL-MCNC: 3.5 G/DL (ref 3.4–5)
ALBUMIN/GLOB SERPL: 1 {RATIO} (ref 1–2)
ALP LIVER SERPL-CCNC: 96 U/L
ALT SERPL-CCNC: 18 U/L
ANION GAP SERPL CALC-SCNC: 10 MMOL/L (ref 0–18)
AST SERPL-CCNC: 23 U/L (ref 15–37)
ATRIAL RATE: 75 BPM
BASOPHILS # BLD AUTO: 0.03 X10(3) UL (ref 0–0.2)
BASOPHILS NFR BLD AUTO: 0.2 %
BILIRUB SERPL-MCNC: 0.5 MG/DL (ref 0.1–2)
BUN BLD-MCNC: 20 MG/DL (ref 7–18)
CALCIUM BLD-MCNC: 9.3 MG/DL (ref 8.5–10.1)
CHLORIDE SERPL-SCNC: 97 MMOL/L (ref 98–112)
CO2 SERPL-SCNC: 26 MMOL/L (ref 21–32)
CREAT BLD-MCNC: 1.11 MG/DL
EOSINOPHIL # BLD AUTO: 0.02 X10(3) UL (ref 0–0.7)
EOSINOPHIL NFR BLD AUTO: 0.2 %
ERYTHROCYTE [DISTWIDTH] IN BLOOD BY AUTOMATED COUNT: 12.5 %
GLOBULIN PLAS-MCNC: 3.6 G/DL (ref 2.8–4.4)
GLUCOSE BLD-MCNC: 128 MG/DL (ref 70–99)
HCT VFR BLD AUTO: 32.4 %
HGB BLD-MCNC: 11.5 G/DL
IMM GRANULOCYTES # BLD AUTO: 0.05 X10(3) UL (ref 0–1)
IMM GRANULOCYTES NFR BLD: 0.4 %
LYMPHOCYTES # BLD AUTO: 1.41 X10(3) UL (ref 1–4)
LYMPHOCYTES NFR BLD AUTO: 11.4 %
MCH RBC QN AUTO: 30.3 PG (ref 26–34)
MCHC RBC AUTO-ENTMCNC: 35.5 G/DL (ref 31–37)
MCV RBC AUTO: 85.5 FL
MONOCYTES # BLD AUTO: 0.65 X10(3) UL (ref 0.1–1)
MONOCYTES NFR BLD AUTO: 5.3 %
NEUTROPHILS # BLD AUTO: 10.22 X10 (3) UL (ref 1.5–7.7)
NEUTROPHILS # BLD AUTO: 10.22 X10(3) UL (ref 1.5–7.7)
NEUTROPHILS NFR BLD AUTO: 82.5 %
OSMOLALITY SERPL CALC.SUM OF ELEC: 280 MOSM/KG (ref 275–295)
P-R INTERVAL: 146 MS
PLATELET # BLD AUTO: 269 10(3)UL (ref 150–450)
POTASSIUM SERPL-SCNC: 3.4 MMOL/L (ref 3.5–5.1)
PROT SERPL-MCNC: 7.1 G/DL (ref 6.4–8.2)
Q-T INTERVAL: 396 MS
QRS DURATION: 72 MS
QTC CALCULATION (BEZET): 442 MS
R AXIS: 61 DEGREES
RBC # BLD AUTO: 3.79 X10(6)UL
SODIUM SERPL-SCNC: 133 MMOL/L (ref 136–145)
TROPONIN I HIGH SENSITIVITY: 21 NG/L
VENTRICULAR RATE: 75 BPM
WBC # BLD AUTO: 12.4 X10(3) UL (ref 4–11)

## 2022-02-01 PROCEDURE — 80053 COMPREHEN METABOLIC PANEL: CPT | Performed by: EMERGENCY MEDICINE

## 2022-02-01 PROCEDURE — 96367 TX/PROPH/DG ADDL SEQ IV INF: CPT

## 2022-02-01 PROCEDURE — 99284 EMERGENCY DEPT VISIT MOD MDM: CPT

## 2022-02-01 PROCEDURE — 71045 X-RAY EXAM CHEST 1 VIEW: CPT | Performed by: EMERGENCY MEDICINE

## 2022-02-01 PROCEDURE — C9113 INJ PANTOPRAZOLE SODIUM, VIA: HCPCS | Performed by: EMERGENCY MEDICINE

## 2022-02-01 PROCEDURE — 99285 EMERGENCY DEPT VISIT HI MDM: CPT

## 2022-02-01 PROCEDURE — 85025 COMPLETE CBC W/AUTO DIFF WBC: CPT | Performed by: EMERGENCY MEDICINE

## 2022-02-01 PROCEDURE — 84484 ASSAY OF TROPONIN QUANT: CPT | Performed by: EMERGENCY MEDICINE

## 2022-02-01 PROCEDURE — 93005 ELECTROCARDIOGRAM TRACING: CPT

## 2022-02-01 PROCEDURE — 96365 THER/PROPH/DIAG IV INF INIT: CPT

## 2022-02-01 PROCEDURE — 93010 ELECTROCARDIOGRAM REPORT: CPT

## 2022-02-01 RX ORDER — PANTOPRAZOLE SODIUM 40 MG/1
40 TABLET, DELAYED RELEASE ORAL DAILY
Qty: 30 TABLET | Refills: 0 | Status: SHIPPED | OUTPATIENT
Start: 2022-02-01 | End: 2022-03-03

## 2022-02-01 RX ORDER — PANTOPRAZOLE SODIUM 20 MG/1
20 TABLET, DELAYED RELEASE ORAL DAILY
Qty: 30 TABLET | Refills: 0 | OUTPATIENT
Start: 2022-02-01 | End: 2022-03-03

## 2022-02-01 NOTE — ED INITIAL ASSESSMENT (HPI)
PT PRESENTS TO ED WITH FATIGUE, NAUSEA VOMITING, HICCUPS THAT STARTED Friday, PER PT'S WIFE HE TESTED WITH AN AT HOME RAPID TEST FOR COVID Saturday, NEGATIVE, COMPLAINING OF DIAPHRAGM PAIN. COMPLAINING OF SHORTNESS OF BREATH, DENIES DIARRHEA. PER PT'S WIFE PT THREW UP COFFEE GROUND EMESIS X1 ON Saturday, HAS NOT SINCE.   COMPLAINING OF UPPER ABDOMINAL PAIN

## 2022-03-11 PROBLEM — D50.9 IRON DEFICIENCY ANEMIA: Status: ACTIVE | Noted: 2022-03-11

## 2022-03-11 PROBLEM — D62 ACUTE BLOOD LOSS ANEMIA: Status: ACTIVE | Noted: 2021-08-06

## 2022-03-11 PROBLEM — S72.001A HIP FRACTURE, RIGHT (HCC): Status: ACTIVE | Noted: 2021-08-02

## 2022-04-22 ENCOUNTER — OFFICE VISIT (OUTPATIENT)
Dept: HEMATOLOGY/ONCOLOGY | Facility: HOSPITAL | Age: 77
End: 2022-04-22
Attending: INTERNAL MEDICINE
Payer: MEDICARE

## 2022-04-22 VITALS
WEIGHT: 172.5 LBS | TEMPERATURE: 96 F | RESPIRATION RATE: 16 BRPM | HEIGHT: 67.99 IN | OXYGEN SATURATION: 99 % | SYSTOLIC BLOOD PRESSURE: 129 MMHG | BODY MASS INDEX: 26.14 KG/M2 | HEART RATE: 69 BPM | DIASTOLIC BLOOD PRESSURE: 78 MMHG

## 2022-04-22 DIAGNOSIS — D64.9 NORMOCYTIC ANEMIA: ICD-10-CM

## 2022-04-22 DIAGNOSIS — E53.8 FOLATE DEFICIENCY: ICD-10-CM

## 2022-04-22 DIAGNOSIS — D50.0 IRON DEFICIENCY ANEMIA DUE TO CHRONIC BLOOD LOSS: Primary | ICD-10-CM

## 2022-04-22 DIAGNOSIS — D62 ACUTE BLOOD LOSS ANEMIA: ICD-10-CM

## 2022-04-22 LAB
BASOPHILS # BLD AUTO: 0.06 X10(3) UL (ref 0–0.2)
BASOPHILS NFR BLD AUTO: 0.8 %
DEPRECATED HBV CORE AB SER IA-ACNC: 14.2 NG/ML
EOSINOPHIL # BLD AUTO: 0.48 X10(3) UL (ref 0–0.7)
EOSINOPHIL NFR BLD AUTO: 6.2 %
ERYTHROCYTE [DISTWIDTH] IN BLOOD BY AUTOMATED COUNT: 12.5 %
FOLATE SERPL-MCNC: 8.6 NG/ML (ref 8.7–?)
HCT VFR BLD AUTO: 37.8 %
HGB BLD-MCNC: 11.7 G/DL
IMM GRANULOCYTES # BLD AUTO: 0.02 X10(3) UL (ref 0–1)
IMM GRANULOCYTES NFR BLD: 0.3 %
IRON SATN MFR SERPL: 16 %
IRON SERPL-MCNC: 73 UG/DL
LYMPHOCYTES # BLD AUTO: 1.64 X10(3) UL (ref 1–4)
LYMPHOCYTES NFR BLD AUTO: 21.2 %
MCH RBC QN AUTO: 28.7 PG (ref 26–34)
MCHC RBC AUTO-ENTMCNC: 31 G/DL (ref 31–37)
MCV RBC AUTO: 92.9 FL
MONOCYTES # BLD AUTO: 0.61 X10(3) UL (ref 0.1–1)
MONOCYTES NFR BLD AUTO: 7.9 %
NEUTROPHILS # BLD AUTO: 4.92 X10 (3) UL (ref 1.5–7.7)
NEUTROPHILS # BLD AUTO: 4.92 X10(3) UL (ref 1.5–7.7)
NEUTROPHILS NFR BLD AUTO: 63.6 %
PLATELET # BLD AUTO: 315 10(3)UL (ref 150–450)
RBC # BLD AUTO: 4.07 X10(6)UL
TIBC SERPL-MCNC: 459 UG/DL (ref 240–450)
TRANSFERRIN SERPL-MCNC: 308 MG/DL (ref 200–360)
VIT B12 SERPL-MCNC: 466 PG/ML (ref 193–986)
WBC # BLD AUTO: 7.7 X10(3) UL (ref 4–11)

## 2022-04-22 PROCEDURE — 99205 OFFICE O/P NEW HI 60 MIN: CPT | Performed by: INTERNAL MEDICINE

## 2022-04-22 NOTE — PATIENT INSTRUCTIONS
For triage nurse: [phone] Monday through Friday 7:30-5:00.  *Please note this is a new phone number*    After hours or weekends for emergent needs:  [phone].      To schedule diagnostic testing: Central [phone]    For Medical Records: [phone]

## 2022-04-27 ENCOUNTER — OFFICE VISIT (OUTPATIENT)
Dept: HEMATOLOGY/ONCOLOGY | Facility: HOSPITAL | Age: 77
End: 2022-04-27
Attending: INTERNAL MEDICINE
Payer: MEDICARE

## 2022-04-27 VITALS
RESPIRATION RATE: 16 BRPM | DIASTOLIC BLOOD PRESSURE: 67 MMHG | HEART RATE: 65 BPM | TEMPERATURE: 98 F | OXYGEN SATURATION: 97 % | SYSTOLIC BLOOD PRESSURE: 118 MMHG

## 2022-04-27 DIAGNOSIS — D50.9 IRON DEFICIENCY ANEMIA, UNSPECIFIED IRON DEFICIENCY ANEMIA TYPE: ICD-10-CM

## 2022-04-27 DIAGNOSIS — D50.0 IRON DEFICIENCY ANEMIA DUE TO CHRONIC BLOOD LOSS: Primary | ICD-10-CM

## 2022-04-27 PROCEDURE — 96365 THER/PROPH/DIAG IV INF INIT: CPT

## 2022-04-27 PROCEDURE — 96376 TX/PRO/DX INJ SAME DRUG ADON: CPT

## 2022-04-27 NOTE — PROGRESS NOTES
Education Record    Learner:  Patient    Disease / Diagnosis: here for infed    Barriers / Limitations:  None    Method:  Brief focused, printed material and  reinforcement    General Topics:  Plan of care reviewed    Outcome:  Patient ambulatory with no complaints. Arrived with wife. Tolerated test dose and monitored for 60 min. Tolerated infusion and monitored for 30 min. VSS. Discharged home in stable condition.  Shows understanding

## 2022-05-12 ENCOUNTER — OFFICE VISIT (OUTPATIENT)
Dept: PODIATRY CLINIC | Facility: CLINIC | Age: 77
End: 2022-05-12
Payer: COMMERCIAL

## 2022-05-12 DIAGNOSIS — M20.41 HAMMER TOES OF BOTH FEET: Primary | ICD-10-CM

## 2022-05-12 DIAGNOSIS — M20.42 HAMMER TOES OF BOTH FEET: Primary | ICD-10-CM

## 2022-05-12 DIAGNOSIS — L60.0 ONYCHOCRYPTOSIS: ICD-10-CM

## 2022-05-12 DIAGNOSIS — M21.622 TAILOR'S BUNIONETTE, BILATERAL: ICD-10-CM

## 2022-05-12 DIAGNOSIS — L84 HELOMA MOLLE: ICD-10-CM

## 2022-05-12 DIAGNOSIS — L60.0 INGROWING TOENAIL: ICD-10-CM

## 2022-05-12 DIAGNOSIS — M21.621 TAILOR'S BUNIONETTE, BILATERAL: ICD-10-CM

## 2022-05-12 PROCEDURE — 99213 OFFICE O/P EST LOW 20 MIN: CPT | Performed by: PODIATRIST

## 2022-05-25 ENCOUNTER — NURSE ONLY (OUTPATIENT)
Dept: HEMATOLOGY/ONCOLOGY | Facility: HOSPITAL | Age: 77
End: 2022-05-25
Attending: INTERNAL MEDICINE
Payer: MEDICARE

## 2022-05-25 DIAGNOSIS — D64.9 NORMOCYTIC ANEMIA: ICD-10-CM

## 2022-05-25 DIAGNOSIS — D50.0 IRON DEFICIENCY ANEMIA DUE TO CHRONIC BLOOD LOSS: ICD-10-CM

## 2022-05-25 LAB
BASOPHILS # BLD AUTO: 0.06 X10(3) UL (ref 0–0.2)
BASOPHILS NFR BLD AUTO: 1.1 %
DEPRECATED HBV CORE AB SER IA-ACNC: 258.6 NG/ML
EOSINOPHIL # BLD AUTO: 0.37 X10(3) UL (ref 0–0.7)
EOSINOPHIL NFR BLD AUTO: 6.5 %
ERYTHROCYTE [DISTWIDTH] IN BLOOD BY AUTOMATED COUNT: 13.3 %
FOLATE SERPL-MCNC: 9.5 NG/ML (ref 8.7–?)
HCT VFR BLD AUTO: 38.5 %
HGB BLD-MCNC: 12.5 G/DL
IMM GRANULOCYTES # BLD AUTO: 0.01 X10(3) UL (ref 0–1)
IMM GRANULOCYTES NFR BLD: 0.2 %
IRON SATN MFR SERPL: 29 %
IRON SERPL-MCNC: 96 UG/DL
LYMPHOCYTES # BLD AUTO: 1.6 X10(3) UL (ref 1–4)
LYMPHOCYTES NFR BLD AUTO: 28.2 %
MCH RBC QN AUTO: 30 PG (ref 26–34)
MCHC RBC AUTO-ENTMCNC: 32.5 G/DL (ref 31–37)
MCV RBC AUTO: 92.3 FL
MONOCYTES # BLD AUTO: 0.4 X10(3) UL (ref 0.1–1)
MONOCYTES NFR BLD AUTO: 7.1 %
NEUTROPHILS # BLD AUTO: 3.23 X10 (3) UL (ref 1.5–7.7)
NEUTROPHILS # BLD AUTO: 3.23 X10(3) UL (ref 1.5–7.7)
NEUTROPHILS NFR BLD AUTO: 56.9 %
PLATELET # BLD AUTO: 222 10(3)UL (ref 150–450)
RBC # BLD AUTO: 4.17 X10(6)UL
TIBC SERPL-MCNC: 334 UG/DL (ref 240–450)
TRANSFERRIN SERPL-MCNC: 224 MG/DL (ref 200–360)
WBC # BLD AUTO: 5.7 X10(3) UL (ref 4–11)

## 2022-05-25 PROCEDURE — 82728 ASSAY OF FERRITIN: CPT

## 2022-05-25 PROCEDURE — 83550 IRON BINDING TEST: CPT

## 2022-05-25 PROCEDURE — 85025 COMPLETE CBC W/AUTO DIFF WBC: CPT

## 2022-05-25 PROCEDURE — 83540 ASSAY OF IRON: CPT

## 2022-05-25 PROCEDURE — 82746 ASSAY OF FOLIC ACID SERUM: CPT

## 2022-05-25 PROCEDURE — 36415 COLL VENOUS BLD VENIPUNCTURE: CPT

## 2022-05-26 DIAGNOSIS — E53.8 FOLATE DEFICIENCY: ICD-10-CM

## 2022-05-26 DIAGNOSIS — D50.0 IRON DEFICIENCY ANEMIA DUE TO CHRONIC BLOOD LOSS: Primary | ICD-10-CM

## 2023-03-25 ENCOUNTER — LAB ENCOUNTER (OUTPATIENT)
Dept: LAB | Facility: HOSPITAL | Age: 78
End: 2023-03-25
Attending: INTERNAL MEDICINE
Payer: MEDICARE

## 2023-03-25 DIAGNOSIS — R53.83 FATIGUE: ICD-10-CM

## 2023-03-25 DIAGNOSIS — Z79.899 MEDICATION MANAGEMENT: Primary | ICD-10-CM

## 2023-03-25 LAB
ALBUMIN SERPL-MCNC: 3.6 G/DL (ref 3.4–5)
ALBUMIN/GLOB SERPL: 1 {RATIO} (ref 1–2)
ALP LIVER SERPL-CCNC: 103 U/L
ALT SERPL-CCNC: 25 U/L
ANION GAP SERPL CALC-SCNC: 7 MMOL/L (ref 0–18)
AST SERPL-CCNC: 29 U/L (ref 15–37)
BASOPHILS # BLD AUTO: 0.05 X10(3) UL (ref 0–0.2)
BASOPHILS NFR BLD AUTO: 0.8 %
BILIRUB SERPL-MCNC: 0.5 MG/DL (ref 0.1–2)
BUN BLD-MCNC: 18 MG/DL (ref 7–18)
CALCIUM BLD-MCNC: 9.1 MG/DL (ref 8.5–10.1)
CHLORIDE SERPL-SCNC: 109 MMOL/L (ref 98–112)
CHOLEST SERPL-MCNC: 155 MG/DL (ref ?–200)
CO2 SERPL-SCNC: 27 MMOL/L (ref 21–32)
COMPLEXED PSA SERPL-MCNC: 0.52 NG/ML (ref ?–4)
CREAT BLD-MCNC: 1.19 MG/DL
EOSINOPHIL # BLD AUTO: 0.4 X10(3) UL (ref 0–0.7)
EOSINOPHIL NFR BLD AUTO: 6.5 %
ERYTHROCYTE [DISTWIDTH] IN BLOOD BY AUTOMATED COUNT: 13 %
ERYTHROCYTE [SEDIMENTATION RATE] IN BLOOD: 19 MM/HR
EST. AVERAGE GLUCOSE BLD GHB EST-MCNC: 114 MG/DL (ref 68–126)
FASTING PATIENT LIPID ANSWER: YES
FASTING STATUS PATIENT QL REPORTED: YES
GFR SERPLBLD BASED ON 1.73 SQ M-ARVRAT: 62 ML/MIN/1.73M2 (ref 60–?)
GLOBULIN PLAS-MCNC: 3.5 G/DL (ref 2.8–4.4)
GLUCOSE BLD-MCNC: 111 MG/DL (ref 70–99)
HBA1C MFR BLD: 5.6 % (ref ?–5.7)
HCT VFR BLD AUTO: 38.2 %
HDLC SERPL-MCNC: 49 MG/DL (ref 40–59)
HGB BLD-MCNC: 12.9 G/DL
IMM GRANULOCYTES # BLD AUTO: 0.01 X10(3) UL (ref 0–1)
IMM GRANULOCYTES NFR BLD: 0.2 %
LDLC SERPL CALC-MCNC: 84 MG/DL (ref ?–100)
LYMPHOCYTES # BLD AUTO: 1.84 X10(3) UL (ref 1–4)
LYMPHOCYTES NFR BLD AUTO: 30 %
MCH RBC QN AUTO: 31 PG (ref 26–34)
MCHC RBC AUTO-ENTMCNC: 33.8 G/DL (ref 31–37)
MCV RBC AUTO: 91.8 FL
MONOCYTES # BLD AUTO: 0.54 X10(3) UL (ref 0.1–1)
MONOCYTES NFR BLD AUTO: 8.8 %
NEUTROPHILS # BLD AUTO: 3.29 X10 (3) UL (ref 1.5–7.7)
NEUTROPHILS # BLD AUTO: 3.29 X10(3) UL (ref 1.5–7.7)
NEUTROPHILS NFR BLD AUTO: 53.7 %
NONHDLC SERPL-MCNC: 106 MG/DL (ref ?–130)
OSMOLALITY SERPL CALC.SUM OF ELEC: 299 MOSM/KG (ref 275–295)
PLATELET # BLD AUTO: 280 10(3)UL (ref 150–450)
POTASSIUM SERPL-SCNC: 4.3 MMOL/L (ref 3.5–5.1)
PROT SERPL-MCNC: 7.1 G/DL (ref 6.4–8.2)
RBC # BLD AUTO: 4.16 X10(6)UL
RHEUMATOID FACT SERPL-ACNC: 24 IU/ML (ref ?–15)
SODIUM SERPL-SCNC: 143 MMOL/L (ref 136–145)
T4 FREE SERPL-MCNC: 1 NG/DL (ref 0.8–1.7)
TRIGL SERPL-MCNC: 121 MG/DL (ref 30–149)
TSI SER-ACNC: 3.48 MIU/ML (ref 0.36–3.74)
VLDLC SERPL CALC-MCNC: 19 MG/DL (ref 0–30)
WBC # BLD AUTO: 6.1 X10(3) UL (ref 4–11)

## 2023-03-25 PROCEDURE — 85025 COMPLETE CBC W/AUTO DIFF WBC: CPT

## 2023-03-25 PROCEDURE — 36415 COLL VENOUS BLD VENIPUNCTURE: CPT

## 2023-03-25 PROCEDURE — 86038 ANTINUCLEAR ANTIBODIES: CPT

## 2023-03-25 PROCEDURE — 86225 DNA ANTIBODY NATIVE: CPT

## 2023-03-25 PROCEDURE — 83036 HEMOGLOBIN GLYCOSYLATED A1C: CPT

## 2023-03-25 PROCEDURE — 84443 ASSAY THYROID STIM HORMONE: CPT

## 2023-03-25 PROCEDURE — 80053 COMPREHEN METABOLIC PANEL: CPT

## 2023-03-25 PROCEDURE — 84439 ASSAY OF FREE THYROXINE: CPT

## 2023-03-25 PROCEDURE — 85652 RBC SED RATE AUTOMATED: CPT

## 2023-03-25 PROCEDURE — 86431 RHEUMATOID FACTOR QUANT: CPT

## 2023-03-25 PROCEDURE — 80061 LIPID PANEL: CPT

## 2023-03-27 LAB
DSDNA IGG SERPL IA-ACNC: 0.8 IU/ML
ENA AB SER QL IA: 0.1 UG/L
ENA AB SER QL IA: NEGATIVE

## 2023-04-14 ENCOUNTER — APPOINTMENT (OUTPATIENT)
Dept: GENERAL RADIOLOGY | Facility: HOSPITAL | Age: 78
End: 2023-04-14
Attending: EMERGENCY MEDICINE
Payer: MEDICARE

## 2023-04-14 ENCOUNTER — HOSPITAL ENCOUNTER (EMERGENCY)
Facility: HOSPITAL | Age: 78
Discharge: HOME | End: 2023-04-14
Attending: EMERGENCY MEDICINE
Payer: MEDICARE

## 2023-04-14 VITALS
TEMPERATURE: 98 F | OXYGEN SATURATION: 95 % | SYSTOLIC BLOOD PRESSURE: 148 MMHG | HEART RATE: 64 BPM | RESPIRATION RATE: 18 BRPM | DIASTOLIC BLOOD PRESSURE: 75 MMHG

## 2023-04-14 DIAGNOSIS — R11.2 NAUSEA AND VOMITING, UNSPECIFIED VOMITING TYPE: ICD-10-CM

## 2023-04-14 DIAGNOSIS — R07.89 CHEST WALL PAIN: Primary | ICD-10-CM

## 2023-04-14 LAB
ALBUMIN SERPL-MCNC: 3.9 G/DL (ref 3.4–5)
ALBUMIN/GLOB SERPL: 1.1 {RATIO} (ref 1–2)
ALP LIVER SERPL-CCNC: 101 U/L
ALT SERPL-CCNC: 29 U/L
ANION GAP SERPL CALC-SCNC: 6 MMOL/L (ref 0–18)
ANTIBODY SCREEN: NEGATIVE
AST SERPL-CCNC: 28 U/L (ref 15–37)
BASOPHILS # BLD AUTO: 0.04 X10(3) UL (ref 0–0.2)
BASOPHILS NFR BLD AUTO: 0.5 %
BILIRUB SERPL-MCNC: 0.5 MG/DL (ref 0.1–2)
BUN BLD-MCNC: 17 MG/DL (ref 7–18)
CALCIUM BLD-MCNC: 9.4 MG/DL (ref 8.5–10.1)
CHLORIDE SERPL-SCNC: 104 MMOL/L (ref 98–112)
CO2 SERPL-SCNC: 28 MMOL/L (ref 21–32)
CREAT BLD-MCNC: 1.15 MG/DL
EOSINOPHIL # BLD AUTO: 0.09 X10(3) UL (ref 0–0.7)
EOSINOPHIL NFR BLD AUTO: 1.1 %
ERYTHROCYTE [DISTWIDTH] IN BLOOD BY AUTOMATED COUNT: 12.5 %
GFR SERPLBLD BASED ON 1.73 SQ M-ARVRAT: 65 ML/MIN/1.73M2 (ref 60–?)
GLOBULIN PLAS-MCNC: 3.7 G/DL (ref 2.8–4.4)
GLUCOSE BLD-MCNC: 153 MG/DL (ref 70–99)
HCT VFR BLD AUTO: 38.6 %
HGB BLD-MCNC: 13.3 G/DL
IMM GRANULOCYTES # BLD AUTO: 0.02 X10(3) UL (ref 0–1)
IMM GRANULOCYTES NFR BLD: 0.2 %
LIPASE SERPL-CCNC: 28 U/L (ref 13–75)
LYMPHOCYTES # BLD AUTO: 1.04 X10(3) UL (ref 1–4)
LYMPHOCYTES NFR BLD AUTO: 13 %
MCH RBC QN AUTO: 31 PG (ref 26–34)
MCHC RBC AUTO-ENTMCNC: 34.5 G/DL (ref 31–37)
MCV RBC AUTO: 90 FL
MONOCYTES # BLD AUTO: 0.36 X10(3) UL (ref 0.1–1)
MONOCYTES NFR BLD AUTO: 4.5 %
NEUTROPHILS # BLD AUTO: 6.48 X10 (3) UL (ref 1.5–7.7)
NEUTROPHILS # BLD AUTO: 6.48 X10(3) UL (ref 1.5–7.7)
NEUTROPHILS NFR BLD AUTO: 80.7 %
OSMOLALITY SERPL CALC.SUM OF ELEC: 291 MOSM/KG (ref 275–295)
PLATELET # BLD AUTO: 254 10(3)UL (ref 150–450)
POTASSIUM SERPL-SCNC: 3.7 MMOL/L (ref 3.5–5.1)
PROT SERPL-MCNC: 7.6 G/DL (ref 6.4–8.2)
RBC # BLD AUTO: 4.29 X10(6)UL
RH BLOOD TYPE: POSITIVE
SODIUM SERPL-SCNC: 138 MMOL/L (ref 136–145)
WBC # BLD AUTO: 8 X10(3) UL (ref 4–11)

## 2023-04-14 PROCEDURE — 99284 EMERGENCY DEPT VISIT MOD MDM: CPT

## 2023-04-14 PROCEDURE — 96374 THER/PROPH/DIAG INJ IV PUSH: CPT

## 2023-04-14 PROCEDURE — 99285 EMERGENCY DEPT VISIT HI MDM: CPT

## 2023-04-14 PROCEDURE — 83690 ASSAY OF LIPASE: CPT | Performed by: EMERGENCY MEDICINE

## 2023-04-14 PROCEDURE — 86900 BLOOD TYPING SEROLOGIC ABO: CPT | Performed by: EMERGENCY MEDICINE

## 2023-04-14 PROCEDURE — 86850 RBC ANTIBODY SCREEN: CPT | Performed by: EMERGENCY MEDICINE

## 2023-04-14 PROCEDURE — 85025 COMPLETE CBC W/AUTO DIFF WBC: CPT | Performed by: EMERGENCY MEDICINE

## 2023-04-14 PROCEDURE — 96361 HYDRATE IV INFUSION ADD-ON: CPT

## 2023-04-14 PROCEDURE — 86901 BLOOD TYPING SEROLOGIC RH(D): CPT | Performed by: EMERGENCY MEDICINE

## 2023-04-14 PROCEDURE — 71045 X-RAY EXAM CHEST 1 VIEW: CPT | Performed by: EMERGENCY MEDICINE

## 2023-04-14 PROCEDURE — 80053 COMPREHEN METABOLIC PANEL: CPT | Performed by: EMERGENCY MEDICINE

## 2023-04-14 RX ORDER — ONDANSETRON 4 MG/1
4 TABLET, ORALLY DISINTEGRATING ORAL EVERY 4 HOURS PRN
Qty: 10 TABLET | Refills: 0 | Status: SHIPPED | OUTPATIENT
Start: 2023-04-14 | End: 2023-04-21

## 2023-04-14 RX ORDER — ONDANSETRON 2 MG/ML
4 INJECTION INTRAMUSCULAR; INTRAVENOUS ONCE
Status: COMPLETED | OUTPATIENT
Start: 2023-04-14 | End: 2023-04-14

## 2023-04-14 NOTE — ED QUICK NOTES
Rounding Completed  Family at bedside. Plan of Care reviewed. Waiting for lab results. Pt resting comfortably on cot. Bed is locked and in lowest position. Call light within reach.

## 2023-04-14 NOTE — ED INITIAL ASSESSMENT (HPI)
C/o hx of hiatal hernia that is \"causing pain\"  Pt states pain is just under the left nipple on his chest. Pain is non radiating. Had 2 episodes of emesis PTA, wife states Pt emesis was black in color.

## 2023-08-03 ENCOUNTER — OFFICE VISIT (OUTPATIENT)
Dept: RHEUMATOLOGY | Facility: CLINIC | Age: 78
End: 2023-08-03
Payer: MEDICARE

## 2023-08-03 VITALS
WEIGHT: 174 LBS | RESPIRATION RATE: 16 BRPM | SYSTOLIC BLOOD PRESSURE: 110 MMHG | HEART RATE: 88 BPM | DIASTOLIC BLOOD PRESSURE: 70 MMHG | TEMPERATURE: 97 F | BODY MASS INDEX: 26.37 KG/M2 | HEIGHT: 68 IN | OXYGEN SATURATION: 98 %

## 2023-08-03 DIAGNOSIS — M15.1 HEBERDEN NODE: Primary | ICD-10-CM

## 2023-08-03 DIAGNOSIS — M19.049 HAND ARTHRITIS: ICD-10-CM

## 2023-08-03 DIAGNOSIS — R41.89 COGNITIVE CHANGES: ICD-10-CM

## 2023-08-03 DIAGNOSIS — Z96.641 HISTORY OF RIGHT HIP REPLACEMENT: ICD-10-CM

## 2023-08-03 RX ORDER — MEMANTINE HYDROCHLORIDE 10 MG/1
10 TABLET ORAL 2 TIMES DAILY
COMMUNITY
Start: 2023-05-01

## 2023-08-03 NOTE — PATIENT INSTRUCTIONS
My impression is that you do not have rheumatoid arthritis. I think we are dealing with osteoarthritis. Bunions in the feet and hammertoes in the feet  are indicative of osteoarthritis. Your hands have a few spurs again signs of osteoarthritis. A low rheumatoid factor can be a false positive. My plan is to recheck your rheumatoid factor, along with your inflammation test sed rate and C-reactive protein, and a backup test for rheumatoid arthritis called the CCP antibody. Also x-rays of the hands will be done to look for bony changes that can support the diagnosis of either rheumatoid arthritis or osteoarthritis. Furthermore since your condition is such that you get by with arthritis strength Tylenol this would favor osteoarthritis rheumatoid arthritis is usually more intense and people need stronger medicine than just plain Tylenol. Do the labs and x-rays and I will call you with the results to do further discussion. Thank you for coming to your appointment.   Dr. Negrita Castle

## 2023-08-15 ENCOUNTER — TELEPHONE (OUTPATIENT)
Dept: RHEUMATOLOGY | Facility: CLINIC | Age: 78
End: 2023-08-15

## 2023-08-16 LAB
C-REACTIVE PROTEIN: 1.5 MG/L
CYCLIC CITRULLINATED$PEPTIDE (CCP) AB (IGG): <16 UNITS
RHEUMATOID FACTOR: 32 IU/ML
SED RATE BY MODIFIED$WESTERGREN: 14 MM/H

## 2023-09-06 ENCOUNTER — HOSPITAL ENCOUNTER (OUTPATIENT)
Dept: GENERAL RADIOLOGY | Facility: HOSPITAL | Age: 78
Discharge: HOME | End: 2023-09-06
Attending: INTERNAL MEDICINE
Payer: MEDICARE

## 2023-09-06 DIAGNOSIS — M19.049 HAND ARTHRITIS: ICD-10-CM

## 2023-09-06 DIAGNOSIS — M15.1 HEBERDEN NODE: ICD-10-CM

## 2023-09-06 PROCEDURE — 73130 X-RAY EXAM OF HAND: CPT | Performed by: INTERNAL MEDICINE

## 2023-11-27 RX ORDER — ACETAMINOPHEN/DIPHENHYDRAMINE 500MG-25MG
1 TABLET ORAL DAILY
COMMUNITY

## 2023-12-21 ENCOUNTER — HOSPITAL ENCOUNTER (OUTPATIENT)
Facility: HOSPITAL | Age: 78
Setting detail: HOSPITAL OUTPATIENT SURGERY
Discharge: HOME | End: 2023-12-21
Attending: INTERNAL MEDICINE | Admitting: INTERNAL MEDICINE
Payer: MEDICARE

## 2023-12-21 ENCOUNTER — ANESTHESIA (OUTPATIENT)
Dept: ENDOSCOPY | Facility: HOSPITAL | Age: 78
End: 2023-12-21
Payer: MEDICARE

## 2023-12-21 ENCOUNTER — ANESTHESIA EVENT (OUTPATIENT)
Dept: ENDOSCOPY | Facility: HOSPITAL | Age: 78
End: 2023-12-21
Payer: MEDICARE

## 2023-12-21 VITALS
HEIGHT: 69 IN | RESPIRATION RATE: 18 BRPM | TEMPERATURE: 98 F | OXYGEN SATURATION: 96 % | BODY MASS INDEX: 26.22 KG/M2 | WEIGHT: 177 LBS | DIASTOLIC BLOOD PRESSURE: 63 MMHG | SYSTOLIC BLOOD PRESSURE: 117 MMHG | HEART RATE: 59 BPM

## 2023-12-21 DIAGNOSIS — R13.19 ESOPHAGEAL DYSPHAGIA: ICD-10-CM

## 2023-12-21 DIAGNOSIS — K44.9 HIATAL HERNIA: ICD-10-CM

## 2023-12-21 DIAGNOSIS — K21.00 GASTROESOPHAGEAL REFLUX DISEASE WITH ESOPHAGITIS, UNSPECIFIED WHETHER HEMORRHAGE: ICD-10-CM

## 2023-12-21 DIAGNOSIS — R10.11 RUQ PAIN: ICD-10-CM

## 2023-12-21 DIAGNOSIS — K29.70 GASTRITIS WITHOUT BLEEDING, UNSPECIFIED CHRONICITY, UNSPECIFIED GASTRITIS TYPE: ICD-10-CM

## 2023-12-21 DIAGNOSIS — R10.12 LUQ PAIN: ICD-10-CM

## 2023-12-21 PROCEDURE — 0DB58ZX EXCISION OF ESOPHAGUS, VIA NATURAL OR ARTIFICIAL OPENING ENDOSCOPIC, DIAGNOSTIC: ICD-10-PCS | Performed by: INTERNAL MEDICINE

## 2023-12-21 PROCEDURE — 0DB78ZX EXCISION OF STOMACH, PYLORUS, VIA NATURAL OR ARTIFICIAL OPENING ENDOSCOPIC, DIAGNOSTIC: ICD-10-PCS | Performed by: INTERNAL MEDICINE

## 2023-12-21 PROCEDURE — 0D758ZZ DILATION OF ESOPHAGUS, VIA NATURAL OR ARTIFICIAL OPENING ENDOSCOPIC: ICD-10-PCS | Performed by: INTERNAL MEDICINE

## 2023-12-21 PROCEDURE — 88305 TISSUE EXAM BY PATHOLOGIST: CPT | Performed by: INTERNAL MEDICINE

## 2023-12-21 PROCEDURE — 0DB98ZX EXCISION OF DUODENUM, VIA NATURAL OR ARTIFICIAL OPENING ENDOSCOPIC, DIAGNOSTIC: ICD-10-PCS | Performed by: INTERNAL MEDICINE

## 2023-12-21 RX ORDER — LIDOCAINE HYDROCHLORIDE 10 MG/ML
INJECTION, SOLUTION EPIDURAL; INFILTRATION; INTRACAUDAL; PERINEURAL AS NEEDED
Status: DISCONTINUED | OUTPATIENT
Start: 2023-12-21 | End: 2023-12-21 | Stop reason: SURG

## 2023-12-21 RX ORDER — SODIUM CHLORIDE, SODIUM LACTATE, POTASSIUM CHLORIDE, CALCIUM CHLORIDE 600; 310; 30; 20 MG/100ML; MG/100ML; MG/100ML; MG/100ML
INJECTION, SOLUTION INTRAVENOUS CONTINUOUS
Status: DISCONTINUED | OUTPATIENT
Start: 2023-12-21 | End: 2023-12-21

## 2023-12-21 RX ADMIN — LIDOCAINE HYDROCHLORIDE 100 MG: 10 INJECTION, SOLUTION EPIDURAL; INFILTRATION; INTRACAUDAL; PERINEURAL at 12:54:00

## 2023-12-21 NOTE — DISCHARGE INSTRUCTIONS
Home Care Instructions for  Gastroscopy with Sedation    Diet:  - Resume your regular diet as tolerated unless otherwise instructed. - Start with light meals to minimize bloating.  - Do not drink alcohol today. Medication:  - If you have questions about resuming your normal medications, please contact your Primary Care Physician. Activities:  - Take it easy today. Do not return to work today. - Do not drive today. - Do not operate any machinery today (including kitchen equipment). Gastroscopy:  - You may have a sore throat for 2-3 days following the exam. This is normal. Gargling with warm salt water (1/2 tsp salt to 1 glass warm water) or using throat lozenges will help. - If you experience any sharp pain in your neck, abdomen or chest, vomiting of blood, oral temperature over 100 degrees Fahrenheit, light-headedness or dizziness, or any other problems, contact your doctor. **If unable to reach your doctor, please go to the [facility] Emergency Room**    - Your referring physician will receive a full report of your examination.  - If you do not hear from your doctor's office within two weeks of your biopsy, please call them for your results.

## 2023-12-21 NOTE — ANESTHESIA POSTPROCEDURE EVALUATION
[address] [facility] Patient Status:  Hospital Outpatient Surgery   Age/Gender [de-identified]year old male MRN [id number]   Location [address] [address] Attending Lynn Enriquez MD   Hosp Day # 0 PCP Jacinto De Los Santos MD       Anesthesia Post-op Note    ESOPHAGOGASTRODUODENOSCOPY (EGD) with biopsies and dilation 18-19-20mm    Procedure Summary       Date: 12/21/23 Room / Location: [facility] ENDOSCOPY 02 / [facility] ENDOSCOPY    Anesthesia Start: 1250 Anesthesia Stop: 6875    Procedure: ESOPHAGOGASTRODUODENOSCOPY (EGD) with biopsies and dilation 18-19-20mm Diagnosis:       LUQ pain      Esophageal dysphagia      RUQ pain      Gastroesophageal reflux disease with esophagitis, unspecified whether hemorrhage      Hiatal hernia      Gastritis without bleeding, unspecified chronicity, unspecified gastritis type      (hiatal hernia)    Surgeons: Lynn Enriquez MD Anesthesiologist: Beverly Gaytan MD    Anesthesia Type: MAC ASA Status: 2            Anesthesia Type: MAC    Vitals Value Taken Time   BP 98/51 12/21/23 1310   Temp 98 12/21/23 1314   Pulse 60 12/21/23 1314   Resp 18 12/21/23 1314   SpO2 94 % 12/21/23 1314   Vitals shown include unfiled device data. Patient Location: Endoscopy    Anesthesia Type: MAC    Airway Patency: patent and extubated    Postop Pain Control: adequate    Mental Status: mildly sedated but able to meaningfully participate in the post-anesthesia evaluation    Nausea/Vomiting: none    Cardiopulmonary/Hydration status: stable euvolemic    Complications: no apparent anesthesia related complications    Postop vital signs: stable    Dental Exam: Unchanged from Preop    Patient to be discharged from PACU when criteria met.

## 2023-12-22 NOTE — PROGRESS NOTES
Date: 2023    To: Boby Monterroso  : 1945    I hope this letter finds you doing well. I am writing to inform you of the following: The biopsies obtained at the time of your recent endoscopic procedure were benign and showed no evidence of infection or malignancy. Please call the office at [phone] if there are any questions.     Carl Gomez M.D.

## 2023-12-29 ENCOUNTER — HOSPITAL ENCOUNTER (OUTPATIENT)
Dept: ULTRASOUND IMAGING | Age: 78
Discharge: HOME | End: 2023-12-29
Payer: MEDICARE

## 2023-12-29 DIAGNOSIS — R10.12 LUQ PAIN: ICD-10-CM

## 2023-12-29 DIAGNOSIS — R10.11 RUQ PAIN: ICD-10-CM

## 2023-12-29 DIAGNOSIS — R13.19 ESOPHAGEAL DYSPHAGIA: ICD-10-CM

## 2023-12-29 PROCEDURE — 76700 US EXAM ABDOM COMPLETE: CPT

## 2024-04-13 ENCOUNTER — HOSPITAL ENCOUNTER (OUTPATIENT)
Dept: CT IMAGING | Facility: HOSPITAL | Age: 79
Discharge: HOME | End: 2024-04-13
Payer: MEDICARE

## 2024-04-13 DIAGNOSIS — R10.12 LUQ PAIN: ICD-10-CM

## 2024-04-13 PROCEDURE — 74170 CT ABD WO CNTRST FLWD CNTRST: CPT

## 2024-07-06 ENCOUNTER — HOSPITAL ENCOUNTER (OUTPATIENT)
Age: 79
Discharge: HOME | End: 2024-07-06
Payer: MEDICARE

## 2024-07-06 VITALS
BODY MASS INDEX: 24.88 KG/M2 | SYSTOLIC BLOOD PRESSURE: 117 MMHG | RESPIRATION RATE: 18 BRPM | WEIGHT: 168 LBS | DIASTOLIC BLOOD PRESSURE: 80 MMHG | HEART RATE: 75 BPM | TEMPERATURE: 97 F | OXYGEN SATURATION: 97 % | HEIGHT: 69 IN

## 2024-07-06 DIAGNOSIS — N48.1 BALANITIS: ICD-10-CM

## 2024-07-06 DIAGNOSIS — L29.3 ITCHING OF PENIS: Primary | ICD-10-CM

## 2024-07-06 LAB
BILIRUB UR QL STRIP: NEGATIVE
CLARITY UR: CLEAR
COLOR UR: YELLOW
GLUCOSE UR STRIP-MCNC: NEGATIVE MG/DL
HGB UR QL STRIP: NEGATIVE
KETONES UR STRIP-MCNC: NEGATIVE MG/DL
LEUKOCYTE ESTERASE UR QL STRIP: NEGATIVE
NITRITE UR QL STRIP: NEGATIVE
PH UR STRIP: 5.5 [PH]
PROT UR STRIP-MCNC: NEGATIVE MG/DL
SP GR UR STRIP: 1.02
UROBILINOGEN UR STRIP-ACNC: <2 MG/DL

## 2024-07-06 RX ORDER — CLOTRIMAZOLE 1 %
1 CREAM (GRAM) TOPICAL 2 TIMES DAILY
Qty: 14 G | Refills: 0 | Status: SHIPPED | OUTPATIENT
Start: 2024-07-06

## 2024-07-06 NOTE — ED PROVIDER NOTES
Patient Seen in: [facility]      History     Chief Complaint   Patient presents with    Eval-G     Stated Complaint: Eval g    Subjective:   78-year-old male with PMH of dementia presents with complaint of occasional pain and itching to penis region that began about 2 weeks ago.  Wife is present due to patient's cognitive issues and states that the discomfort is not constant it comes and goes.  Wife states pt is not complaining that there is discomfort with urinating.  Pt is circumcised.   No known trauma.   Leaving for vacation tomorrow, wife wanted to get this checked out before leaving.     Denies fever, chills, abdominal pain, back pain, dysuria, hematuria, wound, nausea, vomiting.     The history is provided by the patient and the spouse.           Objective:   Past Medical History:    Abdominal hernia    Abdominal pain    Anemia    Arthritis    Back pain    Cognitive impairment    Coronary atherosclerosis    Coronary atherosclerosis of native coronary artery    LAD stent    Dementia (HCC)    Dislocated shoulder    Left 1998    Esophageal reflux    Fatigue    High cholesterol    History of depression    Memory loss    mild memory loss, still drives, memantine    Other and unspecified hyperlipidemia    Pain in joints    Rash    Stented coronary artery    Visual impairment    Wears glasses    Weight gain              Past Surgical History:   Procedure Laterality Date    Appendectomy      Back surgery      Cath percutaneous  transluminal coronary angioplasty  05/10/2013    PTCA of LAD and diagonal and stenting of LAD with Xience drug-coated stent    Colonoscopy  01/2018    lymphocytic colitis, otherwsie normal    Egd  01/2018    normal.  Gastric and duodenal bx normal as well    Egd  02/2022    Hip replacement surgery      Other surgical history  age 14    Had a testicle removed for infection    Spine surgery procedure unlisted      disc repair 6 years ago    Total hip replacement Right                  Social History     Socioeconomic History    Marital status:     Number of children: 4   Occupational History    Occupation: Retired    Tobacco Use    Smoking status: Never    Smokeless tobacco: Never   Vaping Use    Vaping status: Never Used   Substance and Sexual Activity    Alcohol use: Yes     Comment: rare    Drug use: No   Other Topics Concern    Exercise Yes     Comment: 6-7 days a week   Social History Narrative    Has 1 grandchild              Review of Systems   Constitutional:  Negative for chills and fever.   Respiratory:  Negative for shortness of breath.    Gastrointestinal:  Negative for nausea and vomiting.   Genitourinary:  Positive for penile pain. Negative for decreased urine volume, dysuria, flank pain, frequency, genital sores, hematuria, penile discharge, penile swelling, scrotal swelling, testicular pain and urgency.       Positive for stated Chief Complaint: Eval-G    Other systems are as noted in HPI.  Constitutional and vital signs reviewed.      All other systems reviewed and negative except as noted above.    Physical Exam     ED Triage Vitals [07/06/24 1228]   /80   Pulse 75   Resp 18   Temp 97.4 °F (36.3 °C)   Temp src Temporal   SpO2 97 %   O2 Device None (Room air)       Current Vitals:   Vital Signs  BP: 117/80  Pulse: 75  Resp: 18  Temp: 97.4 °F (36.3 °C)  Temp src: Temporal    Oxygen Therapy  SpO2: 97 %  O2 Device: None (Room air)            Physical Exam  Exam conducted with a chaperone present.   Constitutional:       Appearance: Normal appearance.   HENT:      Head: Normocephalic.      Nose: Nose normal.      Mouth/Throat:      Mouth: Mucous membranes are moist.      Pharynx: Oropharynx is clear.   Eyes:      Conjunctiva/sclera: Conjunctivae normal.   Cardiovascular:      Rate and Rhythm: Normal rate and regular rhythm.      Heart sounds: No murmur heard.  Pulmonary:      Effort: Pulmonary effort is normal.      Breath sounds: Normal  breath sounds.   Abdominal:      General: Abdomen is flat. Bowel sounds are normal.      Palpations: Abdomen is soft.      Tenderness: There is no abdominal tenderness.   Genitourinary:     Penis: Erythema present.        Musculoskeletal:         General: Normal range of motion.   Neurological:      Mental Status: He is alert.   Psychiatric:         Mood and Affect: Mood normal.               ED Course     Labs Reviewed   [facility] POCT URINALYSIS DIPSTICK           MDM          Medical Decision Making  78-year-old male presents with complaint of occasional pain and itching to penis region that began about 2 weeks ago.  Diff dx include balanitis vs herpes vs uti vs other.  On exam, pt is well appearing with normal vitals.   UA normal  Rx Clotrimazole cream, keep area dry, change into dry clothing as indicated.   PCP follow up if not improving when return from vacation.   ED precautions.   Wife/pt understand and are in agreeable to plan.     Amount and/or Complexity of Data Reviewed  Independent Historian: spouse  External Data Reviewed: labs and notes.  Labs: ordered.    Risk  Prescription drug management.        Disposition and Plan     Clinical Impression:  1. Itching of penis    2. Balanitis         Disposition:  Discharge  7/6/2024  1:07 pm    Follow-up:  Dina Munson MD  [address]  [facility] [address] [phone]                Medications Prescribed:  Discharge Medication List as of 7/6/2024  1:07 PM        START taking these medications    Details   clotrimazole 1 % External Cream Apply 1 Application topically 2 (two) times daily., Normal, Disp-14 g, R-0

## 2024-07-06 NOTE — ED INITIAL ASSESSMENT (HPI)
Pt c/o intermittent pain in penis, sore to touch, no discomfort when urinating, Pt has hx of 1x testicle being removed from infection when he was a child

## 2024-07-06 NOTE — DISCHARGE INSTRUCTIONS
Apply around reddened area of penis twice a day.     Keep clothing dry and change into dry clothing if indicated.     Follow up with PCP if not improving when return from vacation.    If any severe pain, fever, chills please go to an emergency room.

## 2024-07-26 ENCOUNTER — ORDER TRANSCRIPTION (OUTPATIENT)
Dept: ADMINISTRATIVE | Facility: HOSPITAL | Age: 79
End: 2024-07-26

## 2024-07-26 DIAGNOSIS — R06.09 DOE (DYSPNEA ON EXERTION): Primary | ICD-10-CM

## 2024-07-26 DIAGNOSIS — R07.9 CHEST PAIN: ICD-10-CM

## 2024-08-14 ENCOUNTER — HOSPITAL ENCOUNTER (OUTPATIENT)
Dept: GENERAL RADIOLOGY | Age: 79
Discharge: HOME | End: 2024-08-14
Attending: INTERNAL MEDICINE
Payer: MEDICARE

## 2024-08-14 DIAGNOSIS — R07.9 CHEST PAIN: ICD-10-CM

## 2024-08-14 DIAGNOSIS — R06.09 DOE (DYSPNEA ON EXERTION): ICD-10-CM

## 2024-08-14 PROCEDURE — 71111 X-RAY EXAM RIBS/CHEST4/> VWS: CPT | Performed by: INTERNAL MEDICINE

## 2024-08-14 PROCEDURE — 71046 X-RAY EXAM CHEST 2 VIEWS: CPT | Performed by: INTERNAL MEDICINE

## 2024-08-26 ENCOUNTER — LAB ENCOUNTER (OUTPATIENT)
Dept: LAB | Age: 79
End: 2024-08-26
Attending: INTERNAL MEDICINE
Payer: MEDICARE

## 2024-08-26 DIAGNOSIS — R73.9 HIGH BLOOD SUGAR: ICD-10-CM

## 2024-08-26 DIAGNOSIS — D64.9 ANEMIA: ICD-10-CM

## 2024-08-26 DIAGNOSIS — R07.9 CHEST PAIN: Primary | ICD-10-CM

## 2024-08-26 DIAGNOSIS — R31.9 HEMATURIA: ICD-10-CM

## 2024-08-26 DIAGNOSIS — Z79.899 MEDICATION MANAGEMENT: ICD-10-CM

## 2024-08-26 DIAGNOSIS — R53.83 FATIGUE: ICD-10-CM

## 2024-08-26 DIAGNOSIS — I25.10 CAD (CORONARY ARTERY DISEASE): ICD-10-CM

## 2024-08-26 DIAGNOSIS — R06.09 DOE (DYSPNEA ON EXERTION): ICD-10-CM

## 2024-08-26 DIAGNOSIS — R79.9 ABNORMAL BLOOD CHEMISTRY: ICD-10-CM

## 2024-08-26 LAB
ALBUMIN SERPL-MCNC: 4.5 G/DL (ref 3.2–4.8)
ALBUMIN/GLOB SERPL: 1.6 {RATIO} (ref 1–2)
ALP LIVER SERPL-CCNC: 87 U/L
ALT SERPL-CCNC: 15 U/L
ANION GAP SERPL CALC-SCNC: 4 MMOL/L (ref 0–18)
AST SERPL-CCNC: 26 U/L (ref ?–34)
BASOPHILS # BLD AUTO: 0.08 X10(3) UL (ref 0–0.2)
BASOPHILS NFR BLD AUTO: 1.5 %
BILIRUB SERPL-MCNC: 0.4 MG/DL (ref 0.2–1.1)
BILIRUB UR QL STRIP.AUTO: NEGATIVE
BUN BLD-MCNC: 21 MG/DL (ref 9–23)
CALCIUM BLD-MCNC: 9.9 MG/DL (ref 8.7–10.4)
CHLORIDE SERPL-SCNC: 105 MMOL/L (ref 98–112)
CHOLEST SERPL-MCNC: 273 MG/DL (ref ?–200)
CLARITY UR REFRACT.AUTO: CLEAR
CO2 SERPL-SCNC: 32 MMOL/L (ref 21–32)
CREAT BLD-MCNC: 1.3 MG/DL
D DIMER PPP FEU-MCNC: 0.71 UG/ML FEU (ref ?–0.8)
EGFRCR SERPLBLD CKD-EPI 2021: 56 ML/MIN/1.73M2 (ref 60–?)
EOSINOPHIL # BLD AUTO: 0.45 X10(3) UL (ref 0–0.7)
EOSINOPHIL NFR BLD AUTO: 8.5 %
ERYTHROCYTE [DISTWIDTH] IN BLOOD BY AUTOMATED COUNT: 13.1 %
EST. AVERAGE GLUCOSE BLD GHB EST-MCNC: 103 MG/DL (ref 68–126)
FASTING PATIENT LIPID ANSWER: YES
FASTING STATUS PATIENT QL REPORTED: YES
FOLATE SERPL-MCNC: 11.6 NG/ML (ref 5.4–?)
GLOBULIN PLAS-MCNC: 2.8 G/DL (ref 2–3.5)
GLUCOSE BLD-MCNC: 93 MG/DL (ref 70–99)
GLUCOSE UR STRIP.AUTO-MCNC: NORMAL MG/DL
HBA1C MFR BLD: 5.2 % (ref ?–5.7)
HCT VFR BLD AUTO: 38 %
HDLC SERPL-MCNC: 45 MG/DL (ref 40–59)
HGB BLD-MCNC: 12.5 G/DL
IMM GRANULOCYTES # BLD AUTO: 0.02 X10(3) UL (ref 0–1)
IMM GRANULOCYTES NFR BLD: 0.4 %
IRON SATN MFR SERPL: 23 %
IRON SERPL-MCNC: 80 UG/DL
KETONES UR STRIP.AUTO-MCNC: NEGATIVE MG/DL
LDLC SERPL CALC-MCNC: 197 MG/DL (ref ?–100)
LEUKOCYTE ESTERASE UR QL STRIP.AUTO: NEGATIVE
LYMPHOCYTES # BLD AUTO: 1.49 X10(3) UL (ref 1–4)
LYMPHOCYTES NFR BLD AUTO: 28.1 %
MCH RBC QN AUTO: 30.7 PG (ref 26–34)
MCHC RBC AUTO-ENTMCNC: 32.9 G/DL (ref 31–37)
MCV RBC AUTO: 93.4 FL
MONOCYTES # BLD AUTO: 0.52 X10(3) UL (ref 0.1–1)
MONOCYTES NFR BLD AUTO: 9.8 %
NEUTROPHILS # BLD AUTO: 2.75 X10 (3) UL (ref 1.5–7.7)
NEUTROPHILS # BLD AUTO: 2.75 X10(3) UL (ref 1.5–7.7)
NEUTROPHILS NFR BLD AUTO: 51.7 %
NITRITE UR QL STRIP.AUTO: NEGATIVE
NONHDLC SERPL-MCNC: 228 MG/DL (ref ?–130)
NT-PROBNP SERPL-MCNC: 111 PG/ML (ref ?–450)
OSMOLALITY SERPL CALC.SUM OF ELEC: 295 MOSM/KG (ref 275–295)
PH UR STRIP.AUTO: 5 [PH] (ref 5–8)
PLATELET # BLD AUTO: 284 10(3)UL (ref 150–450)
POTASSIUM SERPL-SCNC: 4.2 MMOL/L (ref 3.5–5.1)
PROT SERPL-MCNC: 7.3 G/DL (ref 5.7–8.2)
PROT UR STRIP.AUTO-MCNC: NEGATIVE MG/DL
PSA SERPL-MCNC: 0.7 NG/ML (ref ?–4)
RBC # BLD AUTO: 4.07 X10(6)UL
RBC UR QL AUTO: NEGATIVE
SODIUM SERPL-SCNC: 141 MMOL/L (ref 136–145)
SP GR UR STRIP.AUTO: 1.02 (ref 1–1.03)
TOTAL IRON BINDING CAPACITY: 353 UG/DL (ref 250–425)
TRANSFERRIN SERPL-MCNC: 264 MG/DL (ref 215–365)
TRIGL SERPL-MCNC: 163 MG/DL (ref 30–149)
TROPONIN I SERPL HS-MCNC: 8 NG/L
TSI SER-ACNC: 4.13 MIU/ML (ref 0.55–4.78)
UROBILINOGEN UR STRIP.AUTO-MCNC: NORMAL MG/DL
VIT B12 SERPL-MCNC: 346 PG/ML (ref 211–911)
VLDLC SERPL CALC-MCNC: 35 MG/DL (ref 0–30)
WBC # BLD AUTO: 5.3 X10(3) UL (ref 4–11)

## 2024-08-26 PROCEDURE — 81003 URINALYSIS AUTO W/O SCOPE: CPT

## 2024-08-26 PROCEDURE — 82746 ASSAY OF FOLIC ACID SERUM: CPT

## 2024-08-26 PROCEDURE — 85379 FIBRIN DEGRADATION QUANT: CPT

## 2024-08-26 PROCEDURE — 83540 ASSAY OF IRON: CPT

## 2024-08-26 PROCEDURE — 84153 ASSAY OF PSA TOTAL: CPT

## 2024-08-26 PROCEDURE — 82607 VITAMIN B-12: CPT

## 2024-08-26 PROCEDURE — 80053 COMPREHEN METABOLIC PANEL: CPT

## 2024-08-26 PROCEDURE — 83036 HEMOGLOBIN GLYCOSYLATED A1C: CPT

## 2024-08-26 PROCEDURE — 80061 LIPID PANEL: CPT

## 2024-08-26 PROCEDURE — 83550 IRON BINDING TEST: CPT

## 2024-08-26 PROCEDURE — 84443 ASSAY THYROID STIM HORMONE: CPT

## 2024-08-26 PROCEDURE — 85025 COMPLETE CBC W/AUTO DIFF WBC: CPT

## 2024-08-26 PROCEDURE — 36415 COLL VENOUS BLD VENIPUNCTURE: CPT

## 2024-08-26 PROCEDURE — 83880 ASSAY OF NATRIURETIC PEPTIDE: CPT

## 2024-08-26 PROCEDURE — 84484 ASSAY OF TROPONIN QUANT: CPT

## 2024-09-30 ENCOUNTER — HOSPITAL ENCOUNTER (OUTPATIENT)
Dept: MRI IMAGING | Facility: HOSPITAL | Age: 79
Discharge: HOME | End: 2024-09-30
Attending: Other
Payer: MEDICARE

## 2024-09-30 DIAGNOSIS — G30.1 MODERATE LATE ONSET ALZHEIMER'S DEMENTIA WITH MOOD DISTURBANCE (HCC): ICD-10-CM

## 2024-09-30 DIAGNOSIS — F02.B3 MODERATE LATE ONSET ALZHEIMER'S DEMENTIA WITH MOOD DISTURBANCE (HCC): ICD-10-CM

## 2024-09-30 DIAGNOSIS — R41.89 COGNITIVE IMPAIRMENT: ICD-10-CM

## 2024-09-30 PROCEDURE — A9575 INJ GADOTERATE MEGLUMI 0.1ML: HCPCS

## 2024-09-30 PROCEDURE — 70553 MRI BRAIN STEM W/O & W/DYE: CPT | Performed by: OTHER

## 2024-09-30 RX ORDER — GADOTERATE MEGLUMINE 376.9 MG/ML
15 INJECTION INTRAVENOUS
Status: COMPLETED | OUTPATIENT
Start: 2024-09-30 | End: 2024-09-30

## 2024-09-30 RX ADMIN — GADOTERATE MEGLUMINE 15 ML: 376.9 INJECTION INTRAVENOUS at 08:19:00

## 2024-09-30 NOTE — PROGRESS NOTES
Dear Mr Bulak;    The MRI of Brain showed significant atrophy or shrinkage and also presence of hardening of small arteries leading to what we call \"mini-strokes\" often silent without symptoms    The other part of finding is the cavity within the brain is enlarged and conditions like Normal Pressure Hydrocephalus cannot be excluded.  This is also called NPH.  This is a treatable cause of Dementia if it is proven to be the case      Dr. UMA Shafer

## 2024-10-02 ENCOUNTER — TELEPHONE (OUTPATIENT)
Dept: NEUROLOGY | Facility: CLINIC | Age: 79
End: 2024-10-02

## 2024-10-02 NOTE — TELEPHONE ENCOUNTER
Spoke with patient's wife. She read Dr. Shafer's note connected to MRI results.     Patient and spouse comfortable seeing CARRIE Deleon if an earlier appointment is available. Otherwise they will keep current appointment and be added to cancellation list.     Patient's wife expressed understanding.     Call transferred to .

## 2024-10-28 ENCOUNTER — HOSPITAL ENCOUNTER (OUTPATIENT)
Age: 79
Discharge: HOME | End: 2024-10-28
Payer: MEDICARE

## 2024-10-28 ENCOUNTER — APPOINTMENT (OUTPATIENT)
Dept: GENERAL RADIOLOGY | Age: 79
End: 2024-10-28
Attending: NURSE PRACTITIONER
Payer: MEDICARE

## 2024-10-28 VITALS
OXYGEN SATURATION: 96 % | TEMPERATURE: 97 F | HEART RATE: 72 BPM | DIASTOLIC BLOOD PRESSURE: 67 MMHG | RESPIRATION RATE: 19 BRPM | SYSTOLIC BLOOD PRESSURE: 140 MMHG
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
## 2024-10-28 DIAGNOSIS — S20.212A CONTUSION OF RIB ON LEFT SIDE, INITIAL ENCOUNTER: Primary | ICD-10-CM

## 2024-10-28 PROCEDURE — 99213 OFFICE O/P EST LOW 20 MIN: CPT | Performed by: NURSE PRACTITIONER

## 2024-10-28 PROCEDURE — 71101 X-RAY EXAM UNILAT RIBS/CHEST: CPT | Performed by: NURSE PRACTITIONER

## 2024-10-28 NOTE — ED INITIAL ASSESSMENT (HPI)
10/27 Pt fell while on a walk, Pt c/o pain to the left ribs and chest which increases with coughing.      Denies: CP, LOC/hitting head

## 2024-10-28 NOTE — ED PROVIDER NOTES
Patient Seen in: [facility]      History     Chief Complaint   Patient presents with    Fall    Pain     Stated Complaint: Fell (left side pain)    Subjective:   This is an 79-year-old male with below stated medical history.  Presents to immediate care for trip and fall.  Patient's family ember states that he tends to shuffle when he walks and tripped yesterday falling onto his left side.  Pain in the left anterior ribs.  Family member denies any head injury or loss of consciousness.  He denies any neck or back pain.  He is not anticoagulated.  No other treatment attempted prior to arrival.    The history is provided by the patient and a relative.             Objective:     Past Medical History:    Abdominal hernia    Abdominal pain    Anemia    Arthritis    Back pain    Cognitive impairment    Coronary atherosclerosis    Coronary atherosclerosis of native coronary artery    LAD stent    Dementia (HCC)    Dislocated shoulder    Left 1998    Esophageal reflux    Fatigue    High cholesterol    History of depression    Memory loss    mild memory loss, still drives, memantine    Other and unspecified hyperlipidemia    Pain in joints    Rash    Stented coronary artery    Visual impairment    Wears glasses    Weight gain              Past Surgical History:   Procedure Laterality Date    Appendectomy      Back surgery      Cath percutaneous  transluminal coronary angioplasty  05/10/2013    PTCA of LAD and diagonal and stenting of LAD with Xience drug-coated stent    Colonoscopy  01/2018    lymphocytic colitis, otherwsie normal    Egd  01/2018    normal.  Gastric and duodenal bx normal as well    Egd  02/2022    Hip replacement surgery Right     Other surgical history  age 14    Had a testicle removed for infection    Spine surgery procedure unlisted      disc repair 6 years ago    Total hip replacement Right                 Social History     Socioeconomic History    Marital status:     Number  of children: 4   Occupational History    Occupation: Retired    Tobacco Use    Smoking status: Never    Smokeless tobacco: Never   Vaping Use    Vaping status: Never Used   Substance and Sexual Activity    Alcohol use: Yes     Comment: rare    Drug use: No   Other Topics Concern    Caffeine Concern Yes     Comment: very seldom, herbal tea    Exercise No   Social History Narrative    Has 1 grandchild              Review of Systems   Constitutional:  Negative for chills and fever.   HENT:  Negative for congestion and sore throat.    Respiratory:  Negative for cough.    Cardiovascular:  Positive for chest pain. Negative for palpitations and leg swelling.   Gastrointestinal:  Negative for abdominal pain, diarrhea, nausea and vomiting.   Genitourinary:  Negative for dysuria.   Musculoskeletal:  Positive for arthralgias. Negative for back pain, neck pain and neck stiffness.   Skin:  Negative for rash.   Neurological:  Negative for dizziness, syncope and headaches.       Positive for stated complaint: Fell (left side pain)  Other systems are as noted in HPI.  Constitutional and vital signs reviewed.      All other systems reviewed and negative except as noted above.    Physical Exam     ED Triage Vitals [10/28/24 0925]   /67   Pulse 72   Resp 19   Temp 97.4 °F (36.3 °C)   Temp src Temporal   SpO2 96 %   O2 Device None (Room air)       Current Vitals:   Vital Signs  BP: 140/67  Pulse: 72  Resp: 19  Temp: 97.4 °F (36.3 °C)  Temp src: Temporal    Oxygen Therapy  SpO2: 96 %  O2 Device: None (Room air)        Physical Exam  Vitals and nursing note reviewed.   Constitutional:       General: He is not in acute distress.     Appearance: Normal appearance. He is not ill-appearing, toxic-appearing or diaphoretic.   HENT:      Head: Normocephalic and atraumatic.      Right Ear: External ear normal.      Left Ear: External ear normal.      Nose: Nose normal.      Mouth/Throat:      Mouth: Mucous membranes are  moist.      Pharynx: Oropharynx is clear.   Eyes:      General:         Right eye: No discharge.         Left eye: No discharge.      Extraocular Movements: Extraocular movements intact.      Conjunctiva/sclera: Conjunctivae normal.   Cardiovascular:      Rate and Rhythm: Normal rate and regular rhythm.      Heart sounds: Normal heart sounds. No murmur heard.  Pulmonary:      Effort: Pulmonary effort is normal. No respiratory distress.      Breath sounds: Normal breath sounds. No stridor. No wheezing, rhonchi or rales.   Chest:      Chest wall: Tenderness present. No lacerations, deformity, swelling, crepitus or edema.       Musculoskeletal:      Cervical back: Neck supple.      Right lower leg: No edema.      Left lower leg: No edema.   Skin:     General: Skin is warm and dry.      Capillary Refill: Capillary refill takes less than 2 seconds.      Findings: No rash.   Neurological:      Mental Status: He is alert and oriented to person, place, and time.   Psychiatric:         Mood and Affect: Mood normal.         Behavior: Behavior normal.             ED Course   Labs Reviewed - No data to display         Xray PA chest with left ribs       MDM      Vital signs stable.  Patient is well appearing and non toxic looking.  Presents to the IC for fall with left-sided rib injury.     Differential diagnosis includes but is no limited too sprain, contusion, fracture, pneumothorax, pulmonary contusion    Lungs are clear bilaterally.  No evidence of respiratory distress or hypoxia.  Tenderness to the left anterior ribs underneath the nipple.  No crepitus or deformity.  No bruising, lesions, redness of the skin.    Xrays films reviewed and interpreted by myself. Results show no acute rib fracture, pneumothorax, or evidence of pulmonary contusion.    Clinical impression is rib contusion.    PA home.  Nursing staff gave incentive spirometer treat with teaching.  Rice instructions reviewed.  Tylenol for pain.  PCP follow up as  needed.  Patient and his family member verbalized understanding and agreed with plan of care.  All questions were answered.              Medical Decision Making      Disposition and Plan     Clinical Impression:  1. Contusion of rib on left side, initial encounter    2. Fall, initial encounter         Disposition:  Discharge  10/28/2024 10:12 am    Follow-up:  Dina Munson MD  [address] [place] [address] [place] [id number]  [phone]    In 1 week            Medications Prescribed:  Current Discharge Medication List              Supplementary Documentation:

## 2024-10-29 ENCOUNTER — TELEPHONE (OUTPATIENT)
Dept: NEUROLOGY | Facility: CLINIC | Age: 79
End: 2024-10-29

## 2024-10-29 NOTE — TELEPHONE ENCOUNTER
Received neuropsychological evaluation and reviewed by Dr. Shafer during office visit today.    Copy to scanning

## 2024-11-13 ENCOUNTER — TELEPHONE (OUTPATIENT)
Dept: NEUROLOGY | Facility: CLINIC | Age: 79
End: 2024-11-13

## 2024-11-13 NOTE — TELEPHONE ENCOUNTER
Confirmed with radiology that provider does want PET Brain Metabolism (glucose) scan [id number].  Informed radiology.

## 2024-11-19 ENCOUNTER — HOSPITAL ENCOUNTER (OUTPATIENT)
Dept: NUCLEAR MEDICINE | Facility: HOSPITAL | Age: 79
Discharge: HOME | End: 2024-11-19
Attending: Other
Payer: MEDICARE

## 2024-11-19 DIAGNOSIS — G91.2 NPH (NORMAL PRESSURE HYDROCEPHALUS) (HCC): ICD-10-CM

## 2024-11-19 DIAGNOSIS — G31.83 MILD LEWY BODY DEMENTIA WITHOUT BEHAVIORAL DISTURBANCE, PSYCHOTIC DISTURBANCE, MOOD DISTURBANCE, OR ANXIETY (HCC): ICD-10-CM

## 2024-11-19 DIAGNOSIS — F02.A0 MILD LEWY BODY DEMENTIA WITHOUT BEHAVIORAL DISTURBANCE, PSYCHOTIC DISTURBANCE, MOOD DISTURBANCE, OR ANXIETY (HCC): ICD-10-CM

## 2024-11-19 DIAGNOSIS — R41.89 COGNITIVE IMPAIRMENT: ICD-10-CM

## 2024-11-19 LAB — GLUCOSE BLD-MCNC: 90 MG/DL (ref 70–99)

## 2024-11-19 PROCEDURE — 82962 GLUCOSE BLOOD TEST: CPT

## 2024-11-19 PROCEDURE — 78608 BRAIN IMAGING (PET): CPT | Performed by: OTHER

## 2024-11-19 NOTE — PROGRESS NOTES
Please call: PET scan does not show pattern of neurodegenerative cause of memory problem  Either it is from the NPH or multiple small vessel silent sttrokes  Recommend proceeding with CISTERNOGRAM to rule out NPH     Dr Shafer

## 2024-11-20 ENCOUNTER — TELEPHONE (OUTPATIENT)
Dept: NEUROLOGY | Facility: CLINIC | Age: 79
End: 2024-11-20

## 2024-11-20 NOTE — TELEPHONE ENCOUNTER
Please call them with what I wrote under the result of PET SCAN    Normal PET scan for Dementia (or Lewy body) therefor proceed with Cisternogram to evaluate whether gait issue is due to Normal Pressure Hydrocephalus.    Dr AVELAR

## 2024-11-20 NOTE — TELEPHONE ENCOUNTER
Patient wife calling, she stated Dr Shafer call them yesterday regarding the PET scan test results

## 2024-11-25 NOTE — TELEPHONE ENCOUNTER
Rosibel states that cisternogram is scheduled for the week after Thanksgiving. If Ed's symptoms are not related to NPH, what can they do to help prevent / mitigate the silent strokes? Should they consider ASA or other antiplatelet?

## 2024-12-03 ENCOUNTER — HOSPITAL ENCOUNTER (OUTPATIENT)
Dept: NUCLEAR MEDICINE | Facility: HOSPITAL | Age: 79
Discharge: HOME | End: 2024-12-03
Attending: Other
Payer: MEDICARE

## 2024-12-03 ENCOUNTER — NURSE ONLY (OUTPATIENT)
Dept: LAB | Facility: HOSPITAL | Age: 79
End: 2024-12-03
Attending: Other
Payer: MEDICARE

## 2024-12-03 DIAGNOSIS — G31.83 MILD LEWY BODY DEMENTIA WITHOUT BEHAVIORAL DISTURBANCE, PSYCHOTIC DISTURBANCE, MOOD DISTURBANCE, OR ANXIETY (HCC): ICD-10-CM

## 2024-12-03 DIAGNOSIS — G91.2 NPH (NORMAL PRESSURE HYDROCEPHALUS) (HCC): ICD-10-CM

## 2024-12-03 DIAGNOSIS — R41.89 COGNITIVE IMPAIRMENT: ICD-10-CM

## 2024-12-03 DIAGNOSIS — F02.A0 MILD LEWY BODY DEMENTIA WITHOUT BEHAVIORAL DISTURBANCE, PSYCHOTIC DISTURBANCE, MOOD DISTURBANCE, OR ANXIETY (HCC): ICD-10-CM

## 2024-12-03 LAB
ERYTHROCYTE [DISTWIDTH] IN BLOOD BY AUTOMATED COUNT: 13 %
HCT VFR BLD AUTO: 37.9 %
HGB BLD-MCNC: 12.7 G/DL
INR BLD: 0.97 (ref 0.8–1.2)
MCH RBC QN AUTO: 31.3 PG (ref 26–34)
MCHC RBC AUTO-ENTMCNC: 33.5 G/DL (ref 31–37)
MCV RBC AUTO: 93.3 FL
PLATELET # BLD AUTO: 257 10(3)UL (ref 150–450)
PROTHROMBIN TIME: 13 SECONDS (ref 11.6–14.8)
RBC # BLD AUTO: 4.06 X10(6)UL
WBC # BLD AUTO: 5.9 X10(3) UL (ref 4–11)

## 2024-12-03 PROCEDURE — 36415 COLL VENOUS BLD VENIPUNCTURE: CPT

## 2024-12-03 PROCEDURE — 85027 COMPLETE CBC AUTOMATED: CPT

## 2024-12-03 PROCEDURE — 85610 PROTHROMBIN TIME: CPT

## 2024-12-03 PROCEDURE — 78630 CEREBROSPINAL FLUID SCAN: CPT | Performed by: OTHER

## 2024-12-04 ENCOUNTER — HOSPITAL ENCOUNTER (OUTPATIENT)
Dept: NUCLEAR MEDICINE | Facility: HOSPITAL | Age: 79
Discharge: HOME | End: 2024-12-04
Attending: Other
Payer: MEDICARE

## 2024-12-05 ENCOUNTER — HOSPITAL ENCOUNTER (OUTPATIENT)
Dept: NUCLEAR MEDICINE | Facility: HOSPITAL | Age: 79
Discharge: HOME | End: 2024-12-05
Attending: Other
Payer: MEDICARE

## 2024-12-06 ENCOUNTER — HOSPITAL ENCOUNTER (OUTPATIENT)
Dept: NUCLEAR MEDICINE | Facility: HOSPITAL | Age: 79
End: 2024-12-06
Attending: Other
Payer: MEDICARE

## 2024-12-09 NOTE — PROGRESS NOTES
Please convey results of Cisternogram and tell them I sent message and that they should consult with Neurosurgery Dr Montero or Caleb    I sent a letter,  tried calling but failed    Dr. UMA Shafer

## 2025-01-16 ENCOUNTER — TELEPHONE (OUTPATIENT)
Dept: SURGERY | Facility: CLINIC | Age: 80
End: 2025-01-16

## 2025-01-16 DIAGNOSIS — G91.2 NPH (NORMAL PRESSURE HYDROCEPHALUS) (HCC): Primary | ICD-10-CM

## 2025-01-16 NOTE — TELEPHONE ENCOUNTER
Patient is scheduled for a High Volume Lumbar Puncture on 25 with Dr Salgado at [facility].    Y  Neuro IR (IVS) order placed    Y  Pre-op lab orders placed:  pre-op labs to be done prior to procedure:  CBC, CMP, PTT, PT/INR.    Y  Labs to be done on the DOS:  CSF glucose, protein, cell count, culture: orders placed.     Y  Emailed AFSHAN group in outlook with procedure information including name/, MD, date, time, dx, sx, and location.      Y  PA:  [employer],  routed to PA team for initiation.     CPT code:  44587

## 2025-01-16 NOTE — TELEPHONE ENCOUNTER
You are scheduled for a High Volume Lumbar Puncture on 1.21.25 with Dr Salgado at [facility].  Please check in at the [facility] located at the [facility].     Your physician has ordered a Lumbar Puncture (LP).  This test, sometimes known as a spinal tap, may be done to diagnose or treat a condition.  A lumbar puncture uses a thin, hollow needle and a special form of real-time x-ray called fluoroscopy to remove a small amount of cerebrospinal fluid for lab analysis.      Prior to your arrival:  A nurse from Pre-Admission Testing will be calling you to do a brief Health History Screen over the phone and give you any further instructions if needed.   If you suspect you may be pregnant, please consult with your physician prior to your exam.     You will need labs completed prior to the procedure.  You can go to any [facility] lab to have these labs drawn.     Medication Instructions:  If you are taking an NSAID (Motrin, Advil, or ibuprofen), please call the ordering physician to see if that medication can be held for 7 days or at the time of scheduling.  If you are taking Aspirin, Coumadin (Warfarin) or similar blood thinners, please call the ordering physician to see if that medication can be held for 7 days prior to the procedure.  It is important for Dr. Salgado to be notified if you are not able to hold any of the above medications.The office may need to contact the physician, as this may affect the exam/procedure.  All herbal medication should be held at the time of scheduling/screening.      Dietary Instructions:  You can eat and drink as normal prior to the procedure.      Day of procedure:  Follow instructions provided to you at the time of scheduling the test.  The estimated duration for your visit could take up to 6 hours, which includes recovery time after the procedure.    Your follow up appointment is on 2.6.25 at 9:30 am with Dr Salgado     Post-procedure:  Push caffeinated beverages  after lumbar puncture to help prevent headaches.     It is common to have a mild headache for 3-4 days post lumbar puncture.  If spinal tap headache develops, continue bedrest in increments of 4 hours and then try moving around again.  Drink extra fluids. You may take over-the-counter pain medications.   If the headaches persist for longer than 24 hours, are getting worse, and/or are positional, please call the office.  If headache persists for longer than 24 hours, a blood patch may be advised by your doctor.      You may resume your normal diet.     Back discomfort: ice may be applied to the back for 20 minutes, but do not apply directly to skin.  Wrap ice in a towel and apply ice no longer than 20 minutes every 2-3 hours.     Bathing:  You may shower the day after the procedure.     Return to work:  You may return to work the day following the procedure.     Activity Limitations: Limit strenuous activity for 24 hours.     Once you are at home, notify your provider of any abnormalities, such as:  -Numbness and tingling of the legs  -Drainage of blood or pain at the injection site  -Inability to urinate  -Headaches

## 2025-01-17 NOTE — PAT NURSING NOTE
PreOp Instructions     You are scheduled for: a Neuro Interventional Radiology Procedure     Date of Procedure: 01/21/25. CHECK IN AT 6:30 AM     Diet Instructions: Do not eat or drink anything after midnight including gum, mints, candy, etc.     Medications: Medications you are allowed to take can be taken with a sip of water the morning of your procedure     Skin Prep : Shower with antibacterial soap using a clean washcloth, prior to procedure. Once dried off, no lotions/powders/creams/ointments, etc.     Driving After Procedure: Sedation will be given so you WILL NOT be able to drive home. You will need a responsible adult  to drive you home. You can NOT take uber or taxi unless approved by your physician in advance.     Discharge Teaching: Your nurse will give you specific instructions before discharge, Most people can resume normal activities in 2-3 days, Any questions, please call the physician's office

## 2025-01-17 NOTE — TELEPHONE ENCOUNTER
Prior authorization request completed for: High Volume Lumbar Puncture   Authorization #No Prior Authorization Required.  Pre-D: Not Required.   Exclusions/Restrictions: -based on medical necessity.  Covered Benefit: -based on medical necessity.  Authorization dates: n/a  CPT codes approved: 11732  Number of visits/dates of service approved: 1  Physician: Damian  Location: [facility] -outpatient    Call Ref#: [id number]  Representative Name: Elise   Insurance Carrier: [facility] Medicare Advantage (phone #: [phone])   Total Call Time : 12:02

## 2025-01-18 LAB
ABSOLUTE BASOPHILS: 68 CELLS/UL (ref 0–200)
ABSOLUTE EOSINOPHILS: 394 CELLS/UL (ref 15–500)
ABSOLUTE LYMPHOCYTES: 1510 CELLS/UL (ref 850–3900)
ABSOLUTE MONOCYTES: 524 CELLS/UL (ref 200–950)
ABSOLUTE NEUTROPHILS: 4304 CELLS/UL (ref 1500–7800)
BASOPHILS: 1 %
BUN/CREATININE RATIO: 17 (CALC) (ref 6–22)
BUN: 24 MG/DL (ref 7–25)
CALCIUM: 9.7 MG/DL (ref 8.6–10.3)
CARBON DIOXIDE: 28 MMOL/L (ref 20–32)
CHLORIDE: 104 MMOL/L (ref 98–110)
CREATININE: 1.38 MG/DL (ref 0.7–1.22)
EGFR: 51 ML/MIN/1.73M2
EOSINOPHILS: 5.8 %
GLUCOSE: 72 MG/DL (ref 65–99)
HEMATOCRIT: 39 % (ref 38.5–50)
HEMOGLOBIN: 12.4 G/DL (ref 13.2–17.1)
INR: 1
LYMPHOCYTES: 22.2 %
MCH: 30.7 PG (ref 27–33)
MCHC: 31.8 G/DL (ref 32–36)
MCV: 96.5 FL (ref 80–100)
MONOCYTES: 7.7 %
MPV: 10.5 FL (ref 7.5–12.5)
NEUTROPHILS: 63.3 %
PARTIAL THROMBOPLASTIN$TIME, ACTIVATED: 26 SEC (ref 23–32)
PLATELET COUNT: 297 THOUSAND/UL (ref 140–400)
POTASSIUM: 4.2 MMOL/L (ref 3.5–5.3)
PT: 10.7 SEC (ref 9–11.5)
RDW: 12.1 % (ref 11–15)
RED BLOOD CELL COUNT: 4.04 MILLION/UL (ref 4.2–5.8)
SODIUM: 141 MMOL/L (ref 135–146)
WHITE BLOOD CELL COUNT: 6.8 THOUSAND/UL (ref 3.8–10.8)

## 2025-01-21 ENCOUNTER — HOSPITAL ENCOUNTER (OUTPATIENT)
Dept: INTERVENTIONAL RADIOLOGY/VASCULAR | Facility: HOSPITAL | Age: 80
Discharge: HOME | End: 2025-01-21
Attending: NEUROLOGICAL SURGERY | Admitting: NEUROLOGICAL SURGERY
Payer: MEDICARE

## 2025-01-21 VITALS
TEMPERATURE: 98 F | HEART RATE: 70 BPM | RESPIRATION RATE: 16 BRPM | DIASTOLIC BLOOD PRESSURE: 60 MMHG | SYSTOLIC BLOOD PRESSURE: 115 MMHG | OXYGEN SATURATION: 99 %

## 2025-01-21 DIAGNOSIS — G91.2 NPH (NORMAL PRESSURE HYDROCEPHALUS) (HCC): ICD-10-CM

## 2025-01-21 LAB
CLARITY CSF: CLEAR
COLOR CSF: COLORLESS
COUNT PERFORMED ON TUBE: 4
GLUCOSE CSF-MCNC: 66 MG/DL (ref 40–70)
RBC # CSF: 2 /MM3 (ref ?–1)
TOTAL CELLS COUNTED CSF: 0 /MM3 (ref 0–5)
TOTAL VOLUME CSF: 29 ML

## 2025-01-21 PROCEDURE — 62329 THER SPI PNXR CSF FLUOR/CT: CPT | Performed by: NEUROLOGICAL SURGERY

## 2025-01-21 RX ORDER — ONDANSETRON 2 MG/ML
4 INJECTION INTRAMUSCULAR; INTRAVENOUS ONCE AS NEEDED
Status: DISCONTINUED | OUTPATIENT
Start: 2025-01-21 | End: 2025-01-21

## 2025-01-21 RX ORDER — LIDOCAINE HYDROCHLORIDE AND EPINEPHRINE 10; 10 MG/ML; UG/ML
40 INJECTION, SOLUTION INFILTRATION; PERINEURAL ONCE
Status: DISCONTINUED | OUTPATIENT
Start: 2025-01-21 | End: 2025-01-21

## 2025-01-21 RX ORDER — MIDAZOLAM HYDROCHLORIDE 1 MG/ML
INJECTION INTRAMUSCULAR; INTRAVENOUS
Status: DISCONTINUED
Start: 2025-01-21 | End: 2025-01-21 | Stop reason: WASHOUT

## 2025-01-21 NOTE — H&P
H&P from 1/16/25 reviewed - no updates after discussion and exam today    Labs reviewed.    I re-reviewed with the patient and his wife at the bedside the risks, benefits, and alternatives to this procedure and they wish to proceed as planned.

## 2025-01-21 NOTE — PHYSICAL THERAPY NOTE
PHYSICAL THERAPY EVALUATION - INPATIENT     Room Number: 1607  Evaluation Date: 2025  Type of Evaluation: Initial  Physician Order: PT Eval and Treat    Presenting Problem: HVLP for NPH     Reason for Therapy: Mobility Dysfunction and Discharge Planning    PHYSICAL THERAPY ASSESSMENT   Patient is a 79 year old male admitted 2025 for elective HVLP trial for NPH.      Prior to admission, patient's baseline is MOD I.  Patient is currently functioning near baseline with bed mobility, transfers, and gait.  Patient is requiring supervision as a result of the following impairments: impaired standing balance.  Physical Therapy will continue to follow for duration of hospitalization.      PLAN DURING HOSPITALIZATION        PT Treatment Plan: Gait training  Rehab Potential : Good  Frequency (Obs): BID     CURRENT GOALS    Goal #1 Gait assessment s/p HVLP     Goal #2 MOCA s/p HVLP     Goal #3      Goal #4    Goal #5    Goal #6    Goal Comments: Goals established on 2025      HOME SITUATION  Type of Home: House                        Lives With: Spouse               Prior Level of [place]: Pt lives with spouse and is typically independent. Pt ambulates with cane for long distances. Pt and spouse have noticed slower and shuffling gait. Pt is a retired .     SUBJECTIVE  \"I'm always goofy.\"     OBJECTIVE     Fall Risk: Standard fall risk    WEIGHT BEARING RESTRICTION     PAIN ASSESSMENT  Ratin          COGNITION  MOCA score 18/30    RANGE OF MOTION AND STRENGTH ASSESSMENT  Upper extremity ROM and strength are within functional limits     Lower extremity ROM is within functional limits     Lower extremity strength is within functional limits     BALANCE  Static Sitting: Good  Dynamic Sitting: Good  Static Standing: Fair  Dynamic Standing: Fair    ADDITIONAL TESTS                                    ACTIVITY TOLERANCE                         O2 WALK       NEUROLOGICAL FINDINGS                         AM-PAC '6-Clicks' INPATIENT SHORT FORM - BASIC MOBILITY  How much difficulty does the patient currently have...  Patient Difficulty: Turning over in bed (including adjusting bedclothes, sheets and blankets)?: None   Patient Difficulty: Sitting down on and standing up from a chair with arms (e.g., wheelchair, bedside commode, etc.): A Little   Patient Difficulty: Moving from lying on back to sitting on the side of the bed?: None   How much help from another person does the patient currently need...   Help from Another: Moving to and from a bed to a chair (including a wheelchair)?: A Little   Help from Another: Need to walk in hospital room?: A Little   Help from Another: Climbing 3-5 steps with a railing?: A Little     AM-PAC Score:  Raw Score: 20   Approx Degree of Impairment: 35.83%   Standardized Score (AM-PAC Scale): 47.67   CMS Modifier (G-Code): CJ    FUNCTIONAL ABILITY STATUS  Gait Assessment   Functional Mobility/Gait Assessment  Gait Assistance: Supervision  Distance (ft): 200  Assistive Device: None    Skilled Therapy Provided   MOD I for bed mobility.   Supervision for sit-stand transfer.   Ambulatory with RW and supervision.   Pt ambulates with wide JAMILA, flat foot initial contact, step through gait pattern, and slight ER bilaterally.   No festinating or shuffling noted.   Returned to sitting EOB to perform MOCA assessment.     Bed Mobility:  Rolling: MOD I  Supine to sit: MOD I  Sit to supine: MOD I     Transfer Mobility:  Sit to stand: supervision   Stand to sit: supervision  Gait = supervision    Therapist's Comments:  Reviewed POC and assessment results.     Exercise/Education Provided:  Bed mobility  Gait training  Transfer training  MOCA    Patient End of Session: Needs met;Call light within reach;RN aware of session/findings;All patient questions and concerns addressed;Hospital anti-slip socks;Family present      Patient Evaluation Complexity Level:  History Moderate - 1 or 2 personal factors  and/or co-morbidities   Examination of body systems Moderate - addressing a total of 3 or more elements   Clinical Presentation  Moderate - Evolving   Clinical Decision Making Moderate Complexity       PT Session Time: 35 minutes  Gait Training: 10 minutes  Therapeutic Activity: 15 minutes  Neuromuscular Re-education:  minutes  Therapeutic Exercise:  minutes

## 2025-01-21 NOTE — DISCHARGE INSTRUCTIONS
Discharge/After Visit [facility] - Department of Radiology  Myelogram/Lumbar Puncture    Activity  After a Lumbar Puncture/Myelogram: Remain flat in bed until the morning after the procedure. You may get up to walk to the bathroom or sit up for brief periods to eat and safely swallow. Do not do any strenuous exercises or lift over 5 lbs. for 48 hours after the procedure.          Site Care  Keep a bandage on the puncture site for 24 hours after the procedure.  You may shower after 24 hours, but no soaking in a bathtub for 7 days.    Diet  Unless you are on a fluid restriction due to a medical condition, drink lots of fluid to prevent a headache after these procedures.  Resume your usual diet. A slow return to normal foods is an ideal way to minimize nausea.    Pain Management  You may develop a headache that will typically resolve on its own in 1 to 2 days. Lying down should help relieve this pain. You may use usual over-the-counter or prescription pain medications, as needed, if it is not contraindicated due to a medical condition.    Medications  You may resume your usual home medications. If you take blood thinners, you may resume them the day after your procedure. DO NOT take aspirin, Motrin, ibuprofen, or any medications containing NSAIDS (non-steroidal anti-inflammatory drugs) for 24 hours.    When to Seek Medical Advice  Call the provider that ordered this procedure with any questions and to discuss test results. Results may also be available in My Chart. You may also contact the Radiology Nurse at [phone] Monday-Friday 8-5 with any additional questions or concerns.    Dial [phone] and ask the  to page the on-call Interventional Radiologist if any of these occur:  Experience a severe headache or severe pain.  There is a change in color or temperature of the area where the procedure was performed.  You develop increasing pain or shortness of breath.  Unusual drowsiness,  weakness, or dizziness.  Unusual vomiting.   IF YOU FEEL YOU ARE EXPERIENCING AN EMERGENCY,   CALL 911 OR GO TO THE NEAREST EMERGENCY ROOM  4.2.24 MO/  Radiology    Normal Lumbar Puncture  You have had a lumbar puncture. This test is also called a spinal tap.     Home care  Follow these tips when caring for yourself at home:  Once at home, rest as directed by your healthcare provider.  Stay hydrated, unless you are on fluid restriction due to a medical problem   You may also use acetaminophen for pain relief     Follow-up care  Follow up with your healthcare provider, or as advised.   When to get medical care  Call your healthcare provider right away if any of these occur:  Head or neck pain that doesn't go away or that gets worse when upright and improves with lying flat   You feel less alert or have trouble waking up  Repeated upset stomach (nausea) or vomiting  Swelling, pain, bruising, or redness at the puncture site  Fever of 100.4°F (38°C) or higher, or as advised by your provider  Any new neurological symptoms including: leg numbness/weakness, saddle anesthesia, and bowel and bladder incontinence

## 2025-01-21 NOTE — PHYSICAL THERAPY NOTE
PHYSICAL THERAPY TREATMENT NOTE - INPATIENT    Room Number: 2257  Session: 1     Number of Visits to Meet Established Goals: 1    Presenting Problem: HVLP for NPH       PHYSICAL THERAPY ASSESSMENT   Pt does not demonstrate clinically significant improvement in MOCA score s/p HVLP.   MOCA score pre HVLP .   MOCA score post HVLP .     Pt does not demonstrate clinically significant improvement in gait speed or gait quality s/p HVLP.   Gait speed pre HVLP 0.40 m/sec  Gait speed post HVLP 0.48m/sec.  (Minimally clinically important difference 0.1m/sec)    PLAN DURING HOSPITALIZATION      Pt does not require further skilled IP PT.     CURRENT GOALS     Goal #1 Gait assessment s/p HVLP      Goal #2 MOCA s/p HVLP      Goal #3        Goal #4     Goal #5     Goal #6     Goal Comments: Goals established on 2025  GOALS MET    SUBJECTIVE  \"Walking feels fine.\"       OBJECTIVE       WEIGHT BEARING RESTRICTION     PAIN ASSESSMENT   Ratin          BALANCE                                                                                                                       Static Sitting: Good  Dynamic Sitting: Good           Static Standing: Fair  Dynamic Standing: Fair    ACTIVITY TOLERANCE                         O2 WALK       AM-PAC '6-Clicks' INPATIENT SHORT FORM - BASIC MOBILITY  How much difficulty does the patient currently have...  Patient Difficulty: Turning over in bed (including adjusting bedclothes, sheets and blankets)?: None   Patient Difficulty: Sitting down on and standing up from a chair with arms (e.g., wheelchair, bedside commode, etc.): A Little   Patient Difficulty: Moving from lying on back to sitting on the side of the bed?: None   How much help from another person does the patient currently need...   Help from Another: Moving to and from a bed to a chair (including a wheelchair)?: A Little   Help from Another: Need to walk in hospital room?: A Little   Help from Another: Climbing 3-5 steps  with a railing?: A Little     AM-PAC Score:  Raw Score: 20   Approx Degree of Impairment: 35.83%   Standardized Score (AM-PAC Scale): 47.67   CMS Modifier (G-Code): CJ    FUNCTIONAL ABILITY STATUS  Gait Assessment   Functional Mobility/Gait Assessment  Gait Assistance: Supervision  Distance (ft): 200  Assistive Device: None    Skilled Therapy Provided  MOD I for bed mobility.   Sit-stand with supervision.   Ambulatory with supervision.   Ambulates with slightly quicker alexandria, however, not clinically significant.   Continues to ambulate with wide JAMILA, slight ER, and flat foot initial contact.   No festinating or shuffling noted.   Returned to sitting EOB for MOCA assessment.     Bed Mobility:  Rolling: MOD I   Supine<>Sit: MOD I   Sit<>Supine: NT     Transfer Mobility:  Sit<>Stand: supervision   Stand<>Sit: supervision   Gait: supervision    Therapist's Comments:  Pt notes not significant improvement in ambulation s/p HVLP. Discussed results with Pt and spouse.       Patient End of Session: Needs met;Call light within reach;RN aware of session/findings;All patient questions and concerns addressed;Hospital anti-slip socks;Family present    PT Session Time: 30 minutes  Gait Training: 10 minutes  Therapeutic Activity: 15 minutes  Therapeutic Exercise:  minutes   Neuromuscular Re-education:  minutes

## 2025-01-22 PROBLEM — G91.2 NPH (NORMAL PRESSURE HYDROCEPHALUS) (HCC): Status: ACTIVE | Noted: 2025-01-22

## 2025-01-23 LAB
ALBUMIN CSF: 68.7 %
ALPHA1 GLOB CSF: 4.3 %
ALPHA1 GLOB CSF: 4.3 %
ALPHA2 GLOB CSF: 5.2 %
ALPHA2 GLOB CSF: 5.2 %
BETA GLOB CSF: 14.6 %
BETA GLOB CSF: 14.6 %
GAMMA GLOB CSF: 4.6 %
GAMMA GLOB CSF: 4.6 %
PREALB CSF: 2.5 %
PREALB CSF: 2.5 %
PROTEIN TOTAL CSF: 52.4 MG/DL
PROTEIN TOTAL CSF: 52.4 MG/DL

## 2025-01-31 ENCOUNTER — MED REC SCAN ONLY (OUTPATIENT)
Dept: SURGERY | Facility: CLINIC | Age: 80
End: 2025-01-31

## 2025-01-31 ENCOUNTER — TELEPHONE (OUTPATIENT)
Dept: SURGERY | Facility: CLINIC | Age: 80
End: 2025-01-31

## 2025-01-31 NOTE — TELEPHONE ENCOUNTER
Rec'd letter from [facility] requesting  dx codes for lab work.Placed in nurs's bin for provider review.

## 2025-01-31 NOTE — TELEPHONE ENCOUNTER
Received letter from [facility] requesting diagnostic codes for lab work. Provider reviewed and signed form.   Lab orders faxed to #[phone].   Received fax confirmation.

## 2025-02-06 ENCOUNTER — OFFICE VISIT (OUTPATIENT)
Dept: SURGERY | Facility: CLINIC | Age: 80
End: 2025-02-06
Payer: MEDICARE

## 2025-02-06 VITALS
SYSTOLIC BLOOD PRESSURE: 116 MMHG | BODY MASS INDEX: 25.46 KG/M2 | RESPIRATION RATE: 14 BRPM | HEIGHT: 68 IN | OXYGEN SATURATION: 96 % | DIASTOLIC BLOOD PRESSURE: 68 MMHG | WEIGHT: 168 LBS | HEART RATE: 82 BPM

## 2025-02-06 DIAGNOSIS — R41.89 COGNITIVE CHANGES: ICD-10-CM

## 2025-02-06 DIAGNOSIS — R26.9 GAIT ABNORMALITY: Primary | ICD-10-CM

## 2025-02-06 PROCEDURE — 1159F MED LIST DOCD IN RCRD: CPT | Performed by: NEUROLOGICAL SURGERY

## 2025-02-06 PROCEDURE — 1160F RVW MEDS BY RX/DR IN RCRD: CPT | Performed by: NEUROLOGICAL SURGERY

## 2025-02-06 PROCEDURE — 99212 OFFICE O/P EST SF 10 MIN: CPT | Performed by: NEUROLOGICAL SURGERY

## 2025-02-06 NOTE — PROGRESS NOTES
The following individual(s) verbally consented to be recorded using ambient AI listening technology and understand that they can each withdraw their consent to this listening technology at any point by asking the clinician to turn off or pause the recording: Bladimir Montes De Oca and Rosibel Montes De Oca

## 2025-02-06 NOTE — PATIENT INSTRUCTIONS
Refill policies:    Allow 2-3 business days for refills; controlled substances may take longer.  Contact your pharmacy at least 5 days prior to running out of medication and have them send an electronic request or submit request through the “request refill” option in your [employer] account.  Refills are not addressed on weekends; covering physicians do not authorize routine medications on weekends.  No narcotics or controlled substances are refilled after noon on Fridays or by on call physicians.  By law, narcotics must be electronically prescribed.  A 30 day supply with no refills is the maximum allowed.  If your prescription is due for a refill, you may be due for a follow up appointment.  To best provide you care, patients receiving routine medications need to be seen at least once a year.  Patients receiving narcotic/controlled substance medications need to be seen at least once every 3 months.  In the event that your preferred pharmacy does not have the requested medication in stock (e.g. Backordered), it is your responsibility to find another pharmacy that has the requested medication available.  We will gladly send a new prescription to that pharmacy at your request.    Scheduling Tests:    If your physician has ordered radiology tests such as MRI or CT scans, please contact Central Scheduling at [phone] right away to schedule the test.  Once scheduled, the [facility] Centralized Referral Team will work with your insurance carrier to obtain pre-certification or prior authorization.  Depending on your insurance carrier, approval may take 3-10 days.  It is highly recommended patients assure they have received an authorization before having a test performed.  If test is done without insurance authorization, patient may be responsible for the entire amount billed.      Precertification and Prior Authorizations:  If your physician has recommended that you have a procedure or additional testing performed the [facility]  Centralized Referral Team will contact your insurance carrier to obtain pre-certification or prior authorization.    You are strongly encouraged to contact your insurance carrier to verify that your procedure/test has been approved and is a COVERED benefit.  Although the [facility] Centralized Referral Team does its due diligence, the insurance carrier gives the disclaimer that \"Although the procedure is authorized, this does not guarantee payment.\"    Ultimately the patient is responsible for payment.   Thank you for your understanding in this matter.  Paperwork Completion:  If you require FMLA or disability paperwork for your recovery, please make sure to either drop it off or have it faxed to our office at [phone]. Be sure the form has your name and date of birth on it.  The form will be faxed to our Forms Department and they will complete it for you.  There is a 25$ fee for all forms that need to be filled out.  Please be aware there is a 10-14 day turnaround time.  You will need to sign a release of information (RAMSES) form if your paperwork does not come with one.  You may call the Forms Department with any questions at [phone].  Their fax number is [phone].

## 2025-02-07 NOTE — PROGRESS NOTES
[facility]  Neurological Surgery Clinic Note    Antwan Montes De Oca  9/22/1945  [id number]  PCP: Dina Munson MD    REASON FOR VISIT:  Status post high-volume lumbar puncture    HISTORY OF PRESENT ILLNESS:  Antwan Montes De Oca is a 79 year old male who underwent a high-volume lumbar puncture for evaluation for normal pressure hydrocephalus on 1/21/2025.  The patient and his wife report no improvement in his gait or cognition following the high-volume lumbar puncture.  Objective measures on physical therapy evaluations corroborated this lack of improvement.  He reports no issues following the lumbar puncture.    PAST MEDICAL HISTORY:  Past Medical History:    Abdominal hernia    Abdominal pain    Anemia    Arthritis    Back pain    Cognitive impairment    Coronary atherosclerosis    Coronary atherosclerosis of native coronary artery    LAD stent    Dementia (HCC)    Dislocated shoulder    Left 1998    Esophageal reflux    Fatigue    High cholesterol    History of depression    Memory loss    mild memory loss, still drives, memantine    Other and unspecified hyperlipidemia    Pain in joints    Rash    Stented coronary artery    Visual impairment    Wears glasses    Weight gain       PAST SURGICAL HISTORY:  Past Surgical History:   Procedure Laterality Date    Appendectomy      Back surgery      Cath percutaneous  transluminal coronary angioplasty  05/10/2013    PTCA of LAD and diagonal and stenting of LAD with Xience drug-coated stent    Colonoscopy  01/2018    lymphocytic colitis, otherwsie normal    Egd  01/2018    normal.  Gastric and duodenal bx normal as well    Egd  02/2022    Hip replacement surgery Right     Other surgical history  age 14    Had a testicle removed for infection    Spine surgery procedure unlisted      disc repair 6 years ago    Total hip replacement Right        FAMILY HISTORY:  family history includes Alcohol and Other Disorders Associated in his  father; Cancer in his father; Diabetes in his brother, son, and son; Heart Disorder in his mother; Ulcerative Colitis in his son and son.    SOCIAL HISTORY:   reports that he has never smoked. He has never been exposed to tobacco smoke. He has never used smokeless tobacco. He reports current alcohol use. He reports that he does not use drugs.    ALLERGIES:  Allergies[1]    MEDICATIONS:  Medications Ordered Prior to Encounter[2]    REVIEW OF SYSTEMS:  A 10-point system was reviewed.  Pertinent positives and negatives are noted in HPI.      PHYSICAL EXAMINATION:  VITAL SIGNS: /68   Pulse 82   Resp 14   Ht 68\"   Wt 168 lb (76.2 kg)   SpO2 96%   BMI 25.54 kg/m²     A&O, no acute distress  PERRL, EOMi, FS, TM  Full strength x 4, no drift  Sensation intact   No hyperreflexia, no Karla's, no clonus  Shuffling gait, somewhat magnetic    ASSESSMENT:  79-year-old male with cognitive decline and gait difficulty with ventriculomegaly and did not improve with high-volume lumbar puncture    Plan:  He may follow-up with me as needed  Follow-up with neurology for further workup and management    Malik Salgado MD  Neurological Surgery  [facility] Time: 15 min including face to face time, chart review, imaging interpretation, and coordination of care         [1]   Allergies  Allergen Reactions    Ciprofloxacin OTHER (SEE COMMENTS)     Does not remember   [2]   Current Outpatient Medications on File Prior to Visit   Medication Sig Dispense Refill    fexofenadine-pseudoephedrine ER  MG Oral Tablet 12 Hr Take 1 tablet by mouth daily.      Fluocinonide 0.05 % External Solution Apply 1 Application topically daily as needed (dry, itchy ears). 60 mL 3    diphenhydrAMINE-APAP, sleep, (TYLENOL PM EXTRA STRENGTH)  MG Oral Tab Take 1 capsule by mouth daily.      pantoprazole 40 MG Oral Tab EC Take one tablet (40 mg total) by mouth once daily, 30 minutes prior  to breakfast. 90 tablet 3    atorvastatin 80 MG Oral Tab TAKE 1 TABLET BY MOUTH AT BEDTIME 90 tablet 0    donepezil 10 MG Oral Tab Take 1 tablet (10 mg total) by mouth nightly. (Patient not taking: Reported on 2/6/2025)       No current facility-administered medications on file prior to visit.

## 2025-03-04 ENCOUNTER — APPOINTMENT (OUTPATIENT)
Dept: GENERAL RADIOLOGY | Age: 80
End: 2025-03-04
Attending: PHYSICIAN ASSISTANT
Payer: MEDICARE

## 2025-03-04 ENCOUNTER — HOSPITAL ENCOUNTER (OUTPATIENT)
Age: 80
Discharge: HOME | End: 2025-03-04
Payer: MEDICARE

## 2025-03-04 VITALS
TEMPERATURE: 98 F | RESPIRATION RATE: 19 BRPM | SYSTOLIC BLOOD PRESSURE: 160 MMHG | DIASTOLIC BLOOD PRESSURE: 80 MMHG | HEART RATE: 86 BPM | OXYGEN SATURATION: 99 %

## 2025-03-04 DIAGNOSIS — R05.1 ACUTE COUGH: Primary | ICD-10-CM

## 2025-03-04 PROCEDURE — 71046 X-RAY EXAM CHEST 2 VIEWS: CPT | Performed by: PHYSICIAN ASSISTANT

## 2025-03-04 PROCEDURE — 99213 OFFICE O/P EST LOW 20 MIN: CPT | Performed by: PHYSICIAN ASSISTANT

## 2025-03-04 RX ORDER — BENZONATATE 100 MG/1
100 CAPSULE ORAL 3 TIMES DAILY PRN
Qty: 21 CAPSULE | Refills: 0 | Status: SHIPPED | OUTPATIENT
Start: 2025-03-04 | End: 2025-03-11

## 2025-03-04 NOTE — ED PROVIDER NOTES
Patient Seen in: [facility]      History     Chief Complaint   Patient presents with    Cough/URI     Stated Complaint: Cough    Subjective:   HPI    79-year-old male presents for evaluation of a cough.  Symptoms for the last week.  Notes some chest discomfort when coughing.  No associated fever/chills, chest pain or shortness of breath.  Using OTC meds for the symptoms.    Objective:     Past Medical History:    Abdominal hernia    Abdominal pain    Anemia    Arthritis    Back pain    Cognitive impairment    Coronary atherosclerosis    Coronary atherosclerosis of native coronary artery    LAD stent    Dementia (HCC)    Dislocated shoulder    Left 1998    Esophageal reflux    Fatigue    High cholesterol    History of depression    Memory loss    mild memory loss, still drives, memantine    Other and unspecified hyperlipidemia    Pain in joints    Rash    Stented coronary artery    Visual impairment    Wears glasses    Weight gain              Past Surgical History:   Procedure Laterality Date    Appendectomy      Back surgery      Cath percutaneous  transluminal coronary angioplasty  05/10/2013    PTCA of LAD and diagonal and stenting of LAD with Xience drug-coated stent    Colonoscopy  01/2018    lymphocytic colitis, otherwsie normal    Egd  01/2018    normal.  Gastric and duodenal bx normal as well    Egd  02/2022    Hip replacement surgery Right     Other surgical history  age 14    Had a testicle removed for infection    Spine surgery procedure unlisted      disc repair 6 years ago    Total hip replacement Right                 Social History     Socioeconomic History    Marital status:     Number of children: 4   Occupational History    Occupation: Retired    Tobacco Use    Smoking status: Never     Passive exposure: Never    Smokeless tobacco: Never   Vaping Use    Vaping status: Never Used   Substance and Sexual Activity    Alcohol use: Yes     Comment: rare     Drug use: No   Other Topics Concern    Caffeine Concern Yes     Comment: very seldom, herbal tea    Exercise No   Social History Narrative    Has 1 grandchild              Review of Systems    Positive for stated complaint: Cough  Other systems are as noted in HPI.  Constitutional and vital signs reviewed.      All other systems reviewed and negative except as noted above.    Physical Exam     ED Triage Vitals [03/04/25 1724]   /80   Pulse 86   Resp 19   Temp 97.7 °F (36.5 °C)   Temp src Oral   SpO2 99 %   O2 Device None (Room air)       Current Vitals:   Vital Signs  BP: 160/80  Pulse: 86  Resp: 19  Temp: 97.7 °F (36.5 °C)  Temp src: Oral    Oxygen Therapy  SpO2: 99 %  O2 Device: None (Room air)        Physical Exam  Vitals and nursing note reviewed.   Constitutional:       General: He is not in acute distress.  HENT:      Head: Normocephalic and atraumatic.      Right Ear: External ear normal.      Left Ear: External ear normal.      Nose: Nose normal.      Mouth/Throat:      Mouth: Mucous membranes are moist.   Eyes:      Extraocular Movements: Extraocular movements intact.      Pupils: Pupils are equal, round, and reactive to light.   Cardiovascular:      Rate and Rhythm: Normal rate.   Pulmonary:      Effort: Pulmonary effort is normal.      Breath sounds: No wheezing or rhonchi.   Abdominal:      General: Abdomen is flat.   Musculoskeletal:         General: Normal range of motion.      Cervical back: Normal range of motion.   Skin:     General: Skin is warm.   Neurological:      General: No focal deficit present.      Mental Status: He is alert and oriented to person, place, and time.   Psychiatric:         Mood and Affect: Mood normal.         Behavior: Behavior normal.             ED Course   Labs Reviewed - No data to display     79-year-old male presents with complaint of cough, congestion for the last 1 week.  He is afebrile and very well-appearing.  Lung sounds clear with no wheezing or rhonchi and  there is no hypoxia or tachycardia.  Wife concerned about the possibility of pneumonia.  Chest x-ray ordered  Ddx-viral URI, bronchitis, PNA    Chest x-ray is negative for infiltrate or abnormality.  Tessalon Rx'd for cough.  We discussed supportive care and pcp follow up       MDM              Medical Decision Making      Disposition and Plan     Clinical Impression:  1. Acute cough         Disposition:  Discharge  3/4/2025  6:14 pm    Follow-up:  Dina Munson MD  [address] [place] [address] [place] [id number]  [phone]                Medications Prescribed:  Discharge Medication List as of 3/4/2025  6:19 PM        START taking these medications    Details   benzonatate 100 MG Oral Cap Take 1 capsule (100 mg total) by mouth 3 (three) times daily as needed for cough., Normal, Disp-21 capsule, R-0                 Supplementary Documentation:

## 2025-08-28 ENCOUNTER — HOSPITAL ENCOUNTER (OUTPATIENT)
Age: 80
Discharge: HOME | End: 2025-08-28

## 2025-08-28 VITALS
SYSTOLIC BLOOD PRESSURE: 106 MMHG | HEART RATE: 80 BPM | OXYGEN SATURATION: 97 % | DIASTOLIC BLOOD PRESSURE: 68 MMHG | RESPIRATION RATE: 20 BRPM | TEMPERATURE: 98 F

## 2025-08-28 DIAGNOSIS — S80.211A ABRASION OF RIGHT KNEE, INITIAL ENCOUNTER: Primary | ICD-10-CM

## 2025-08-28 PROCEDURE — 99213 OFFICE O/P EST LOW 20 MIN: CPT | Performed by: NURSE PRACTITIONER

## (undated) DEVICE — KIT VLV 5 PC AIR H2O SUCT BX ENDOGATOR CONN

## (undated) DEVICE — GIJAW SINGLE-USE BIOPSY FORCEPS WITH NEEDLE: Brand: GIJAW

## (undated) DEVICE — DILATION SYRINGE: Brand: COOK

## (undated) DEVICE — BITEBLOCK ENDOSCP 60FR MAXI STRP

## (undated) DEVICE — 3M™ RED DOT™ MONITORING ELECTRODE WITH FOAM TAPE AND STICKY GEL, 50/BAG, 20/CASE, 72/PLT 2570: Brand: RED DOT™

## (undated) DEVICE — 10FT COMBINED O2 DELIVERY/CO2 MONITORING. FILTER WITH MICROSTREAM TYPE LUER: Brand: DUAL ADULT NASAL CANNULA

## (undated) DEVICE — STERIS KITS

## (undated) DEVICE — HERCULES 3 STAGE BALLOON ESOPHAGEAL: Brand: HERCULES

## (undated) DEVICE — 1200CC GUARDIAN II: Brand: GUARDIAN

## (undated) NOTE — ED AVS SNAPSHOT
Mariama De Leon   MRN: [id number]    Department:  [facility] Emergency Department   Date of Visit:  6/14/2019           Disclosure     Insurance plans vary and the physician(s) referred by the ER may not be covered by your plan.  Please contact your tell this physician (or your personal doctor if your instructions are to return to your personal doctor) about any new or lasting problems. The primary care or specialist physician will see patients referred from the [facility] Emergency Department.  Gracia Andrews

## (undated) NOTE — ED AVS SNAPSHOT
Benji Hilton   MRN: [id number]    Department:  [facility] Emergency Department   Date of Visit:  6/6/2018           Disclosure     Insurance plans vary and the physician(s) referred by the ER may not be covered by your plan.  Please contact your tell this physician (or your personal doctor if your instructions are to return to your personal doctor) about any new or lasting problems. The primary care or specialist physician will see patients referred from the [facility] Emergency Department.  Davis Hughes